# Patient Record
Sex: MALE | Race: WHITE | NOT HISPANIC OR LATINO | Employment: FULL TIME | URBAN - METROPOLITAN AREA
[De-identification: names, ages, dates, MRNs, and addresses within clinical notes are randomized per-mention and may not be internally consistent; named-entity substitution may affect disease eponyms.]

---

## 2017-04-10 NOTE — PROGRESS NOTES
"  SUBJECTIVE:                                                    Vijay Nicole is a 31 year old male who presents to clinic today for the following health issues:    New Patient/Transfer of Care    Family History of Hypertension with a personal  History of elevated bp o his home monitor:  The patient has a family hx of high blood pressure with his Dad. The patient has been having lower blood pressure in the morning but slightly raised in the evening. The patient checks his blood pressure regularly and gets readings of 115/70 to 140/90. The patient partakes in physical exercise twice a week. He denies headaches.    Family History of Breast Cancer:  The patient's Mom had breast cancer and his sister was positive for the BRCA2 gene. The patient is hoping to get genetic testing done as well to determine if he has breast cancer susceptible genes.    Hyperlipidemia:  When the patient was in high school, he was taking Accutane for treatment of acne. This caused his cholesterol levels to rise. The patient's paternal grandmother had hyperlipidemia.  No symptoms.  No checked for years       Problem list and histories reviewed & adjusted, as indicated.  Additional history: as documented      ROS:  Constitutional, HEENT, cardiovascular, pulmonary, GI, , musculoskeletal, neuro, skin, endocrine and psych systems are negative, except as otherwise noted.    This document serves as a record of the services and decisions personally performed and made by Arcadio Regan MD. It was created on his behalf by Evette Jon, a trained medical scribe. The creation of this document is based on the provider's statements to the medical scribe.  Evette Jon 8:02 AM 4/11/2017  OBJECTIVE:                                                    /78  Pulse 86  Temp 98.5  F (36.9  C) (Oral)  Ht 1.778 m (5' 10\")  Wt 93.4 kg (206 lb)  SpO2 99%  BMI 29.56 kg/m2 Body mass index is 29.56 kg/(m^2).   GENERAL: healthy, alert, well " nourished, well hydrated, no distress  HENT: ear canals- normal; TMs- normal; Nose- normal; Mouth- no ulcers, no lesions  NECK: no tenderness, no adenopathy, no asymmetry, no masses, no stiffness; thyroid- normal to palpation  RESP: lungs clear to auscultation - no rales, no rhonchi, no wheezes  CV: regular rates and rhythm, normal S1 S2, no S3 or S4 and no murmur, no click or rub -  ABDOMEN: soft, no tenderness, no  hepatosplenomegaly, no masses, normal bowel sounds  MS: extremities- no gross deformities noted, no edema  SKIN: no suspicious lesions, no rashes  BREAST: no masses, no tenderness, no nipple discharge, no palpable axillary masses or adenopathy    Diagnostic test results:  Future     ASSESSMENT/PLAN:       John was seen today for establish care.    Diagnoses and all orders for this visit:    Elevated blood pressure reading without diagnosis of hypertension - Continue self-monitoring blood pressure readings. Healthy diet and be more active.  -     Lipid panel reflex to direct LDL; Future  -     Basic metabolic panel; Future    CARDIOVASCULAR SCREENING; LDL GOAL LESS THAN 130    Family history of BRCA2 gene positive - he desires to be tested.  Genetic screening form signed today and will bring to is lab only appointment.   -     BRCA2 single mutation known; Future        Risks, benefits and alternatives of treatments discussed. Plan agreed on.      Followup: Fasting labs    Will call, return to clinic, or go to ED if worsening or symptoms not improving as discussed.    See patient instructions.     BP Screening:   Last 3 BP Readings:    BP Readings from Last 3 Encounters:   04/11/17 138/78       The following was recommended to the patient:  Re-screen BP within a year and recommended lifestyle modifications     Tobacco Cessation:   reports that he has never smoked. He does not have any smokeless tobacco history on file.    BMI:   Estimated body mass index is 29.56 kg/(m^2) as calculated from the  "following:    Height as of this encounter: 1.778 m (5' 10\").    Weight as of this encounter: 93.4 kg (206 lb).   Weight management plan: Patient referred to endocrine and/or weight management specialty      Health Maintenance Topics with due status: Overdue       Topic Date Due    TETANUS IMMUNIZATION (SYSTEM ASSIGNED) 05/25/2001       Health maintenance reviewed/updated? Yes    The information in this document, created by a scribe for me, accurately reflects the services I personally performed and the decisions made by me. I have reviewed and approved this document for accuracy.      Bowen Regan MD   "

## 2017-04-11 ENCOUNTER — OFFICE VISIT (OUTPATIENT)
Dept: FAMILY MEDICINE | Facility: CLINIC | Age: 32
End: 2017-04-11
Payer: COMMERCIAL

## 2017-04-11 VITALS
SYSTOLIC BLOOD PRESSURE: 138 MMHG | WEIGHT: 206 LBS | HEIGHT: 70 IN | OXYGEN SATURATION: 99 % | DIASTOLIC BLOOD PRESSURE: 78 MMHG | TEMPERATURE: 98.5 F | BODY MASS INDEX: 29.49 KG/M2 | HEART RATE: 86 BPM

## 2017-04-11 DIAGNOSIS — R03.0 ELEVATED BLOOD PRESSURE READING WITHOUT DIAGNOSIS OF HYPERTENSION: Primary | ICD-10-CM

## 2017-04-11 DIAGNOSIS — Z13.1 SCREENING FOR DIABETES MELLITUS: ICD-10-CM

## 2017-04-11 DIAGNOSIS — Z13.6 CARDIOVASCULAR SCREENING; LDL GOAL LESS THAN 130: ICD-10-CM

## 2017-04-11 DIAGNOSIS — Z84.81 FAMILY HISTORY OF BRCA2 GENE POSITIVE: ICD-10-CM

## 2017-04-11 PROCEDURE — 99202 OFFICE O/P NEW SF 15 MIN: CPT | Performed by: FAMILY MEDICINE

## 2017-04-11 NOTE — MR AVS SNAPSHOT
After Visit Summary   4/11/2017    John Nicole    MRN: [id number]           Patient Information     Date Of Birth          1985        Visit Information        Provider Department      4/11/2017 11:20 AM Arcadio Regan MD [facility] [place]        Today's Diagnoses     Elevated blood pressure reading without diagnosis of hypertension    -  1    CARDIOVASCULAR SCREENING; LDL GOAL LESS THAN 130        Family history of BRCA2 gene positive          Care Instructions                                     Dietary Approaches to Stop Hypertension (The DASH Diet)   What is hypertension?   Hypertension is blood pressure that keeps being higher than normal. Blood pressure is the force of blood against artery walls as the heart pumps blood through the body. Blood pressure can be unhealthy if it is above 120/80. The higher your blood pressure, the greater the health risk.   High blood pressure can be controlled if you take these steps:   Maintain a healthy weight.   Are physically active.   Follow a healthy eating plan, which includes foods that do not have a lot of salt and sodium.   Do not drink a lot of alcohol.   Diet affects high blood pressure. Following the DASH diet and reducing the amount of sodium in your diet will help lower your blood pressure. It will also help prevent high blood pressure.   What is the DASH diet?   Dietary Approaches to Stop Hypertension (DASH) is a diet that is low in saturated fat, cholesterol, and total fat. It emphasizes fruits, vegetables, and low-fat dairy foods. The DASH diet also includes whole-grain products, fish, poultry, and nuts. It encourages fewer servings of red meat, sweets, and sugar-containing beverages. It is rich in magnesium, potassium, and calcium, as well as protein and fiber.   How do I get started on the DASH diet?   The DASH diet requires no special foods and has no hard-to-follow recipes. Start by seeing how DASH compares with your current  eating habits.   The DASH eating plan illustrated below is based on a diet of 2,000 calories a day. Your healthcare provider or a dietitian can help you determine how many calories a day you need. Most adults need somewhere between 1600 and 2800 calories a day. Serving sizes for different foods vary from 1/2 cup to 1 and 1/4 cups. Check product nutrition labels for serving sizes and the number of calories per serving.                      Number of        Examples of  Food Group      servings         serving size  ----------------------------------------------------------------    Grains and      7 to 8 per day   1 slice of bread  grain products                   1 cup ready-to-eat cold cereal                                   1/2 cup cooked rice, pasta,                                   or cereal    Vegetables      4 to 5 per day   1 cup raw leafy vegetable                                   1/2 cup cooked vegetable                                   6 ounces (oz) vegetable juice      Fruits          4 to 5 per day   1 medium fruit                                   1/4 cup dried fruit                                   1/2 cup fresh, frozen, or                                   canned fruit                                   6 oz fruit juice      Low-fat or      2 to 3 per day   8 oz milk  fat-free                         1 cup yogurt  dairy foods                      1 and 1/2 oz cheese    Lean meats,  poultry,        2 or fewer per   3 oz cooked lean meat,  or fish         day              skinless poultry, or fish    Nuts, seeds,    4 to 5 per week  1/3 cup or 1 and 1/2 oz nuts  and dry beans                    1 tablespoon or 1/2 oz seeds                                   1/2 cup cooked dry beans    Fats and oils   2 to 3 per day   1 teaspoon soft margarine                                   1 tablespoon low-fat mayonnaise                                   2 tablespoons light salad                                    dressing                                   1 teaspoon vegetable oil    Sweets          5 per week       1 tablespoon sugar                                   1 tablespoon jelly or jam                                   1/2 oz jelly beans                                   8 oz lemonade  ----------------------------------------------------------------  Make changes gradually. Here are some suggestions that might help:   If you now eat 1 or 2 servings of vegetables a day, add a serving at lunch and another at dinner.   If you have not been eating fruit regularly, or have only juice at breakfast, add a serving to your meals or have it as a snack.   Drink milk or water with lunch or dinner instead of soda, sugar-sweetened tea, or alcohol. Choose low-fat (1%) or fat-free (nonfat) dairy products so that you are eating fewer calories and less saturated fat, total fat, and cholesterol.   Read food labels on margarines and salad dressings to choose products lowest in fat.   If you now eat large portions of meat, slowly cut back--by a half or a third at each meal. Limit meat to 6 ounces a day (two 3-ounce servings). Three to 4 ounces is about the size of a deck of cards.   Have 2 or more meatless meals each week. Increase servings of vegetables, rice, pasta, and beans in all meals. Try casseroles, pasta, and stir-fuentes dishes, which have less meat and more vegetables, grains, and beans.   Use fruits canned in their own juice. Fresh fruits require little or no preparation. Dried fruits are a good choice to carry with you or to have ready in the car.   Try these snacks ideas: unsalted pretzels or nuts mixed with raisins, matthew crackers, low-fat and fat-free yogurt or frozen yogurt, popcorn with no salt or butter added, and raw vegetables.   Choose whole-grain foods to get more nutrients, including minerals and fiber. For example, choose whole-wheat bread, whole-grain cereals, or brown rice.   Use fresh, frozen, or no-salt-added  canned vegetables.   Remember to also reduce the salt and sodium in your diet. Try to have no more than 2000 milligrams (mg) of sodium per day, with a goal of further reducing the sodium to 1500 mg per day. Three important ways to reduce sodium are:   Eat food products with reduced-sodium or no salt added.   Use less salt when you prepare foods and do not add salt to your food at the table.   Read food labels. Aim for foods that contain less than 5% of the daily value of sodium.   The DASH eating plan is not designed for weight loss. But it contains many lower-calorie foods, such as fruits and vegetables. You can make it lower in calories by replacing high-calorie foods with more fruits and vegetables. Some ideas to increase fruits and vegetables and decrease calories include:   Eat a medium apple instead of 4 shortbread cookies. You'll save 80 calories.   Eat 1/4 cup of dried apricots instead of a 2-ounce bag of pork rinds. You'll save 230 calories.   Have a hamburger that's 3 ounces instead of 6 ounces. Add a 1/2 cup serving of carrots and a 1/2 cup serving of spinach. You'll save more than 200 calories.   Instead of 5 ounces of chicken, have a stir fuentes with 2 ounces of chicken and 1 and 1/2 cups of raw vegetables. Use just a small amount of vegetable oil. You'll save 50 calories.   Have a 1/2 cup serving of low-fat frozen yogurt instead of a 1-and-1/2-ounce chocolate bar. You'll save about 110 calories.   Use low-fat or fat-free condiments, such as fat-free salad dressings.   Eat smaller portions. Cut back gradually.   Use food labels to compare fat and calorie content in packaged foods. Items marked low fat or fat free may be lower in fat but not lower in calories than their regular versions.   Limit foods with lots of added sugar, such as pies, flavored yogurts, candy bars, ice cream, sherbet, regular soft drinks, and fruit drinks.   Drink water or club soda instead of cola or other soda drinks.     For more  "information, see the National Heart, Lung, and Blood [place] Web site at: http://www.nhlbi.nih.gov/health/public/heart/hbp/dash/.                  Thank you very much for trusting me and [facility].   I appreciate the opportunity to serve you and look forward to supporting your healthcare needs in the future.    Sincerely,         Bowen Regan MD           Follow-ups after your visit        Future tests that were ordered for you today     Open Future Orders        Priority Expected Expires Ordered    Lipid panel reflex to direct LDL Routine 5/11/2017 6/10/2017 4/11/2017    Basic metabolic panel Routine 5/11/2017 6/10/2017 4/11/2017    BRCA2 single mutation known Routine 5/11/2017 4/11/2018 4/11/2017            Who to contact     If you have questions or need follow up information about today's clinic visit or your schedule please contact [facility] directly at [phone].  Normal or non-critical lab and imaging results will be communicated to you by BlastRootshart, letter or phone within 4 business days after the clinic has received the results. If you do not hear from us within 7 days, please contact the clinic through BlastRootshart or phone. If you have a critical or abnormal lab result, we will notify you by phone as soon as possible.  Submit refill requests through Telespree or call your pharmacy and they will forward the refill request to us. Please allow 3 business days for your refill to be completed.          Additional Information About Your Visit        BlastRootshart Information     Telespree lets you send messages to your doctor, view your test results, renew your prescriptions, schedule appointments and more. To sign up, go to [email or web address] . Click on \"Log in\" on the left side of the screen, which will take you to the Welcome page. Then click on \"Sign up Now\" on the right side of the page.     You will be asked to enter the access code listed below, as well as some personal " "information. Please follow the directions to create your username and password.     Your access code is: [email or web address]  Expires: 7/10/2017 12:04 PM     Your access code will  in 90 days. If you need help or a new code, please call your [facility] clinic or [phone].        Care EveryWhere ID     This is your Care EveryWhere ID. This could be used by other organizations to access your [facility] medical records  [id number]        Your Vitals Were     Pulse Temperature Height Pulse Oximetry BMI (Body Mass Index)       86 98.5  F (36.9  C) (Oral) 5' 10\" (1.778 m) 99% 29.56 kg/m2        Blood Pressure from Last 3 Encounters:   17 138/78    Weight from Last 3 Encounters:   17 206 lb (93.4 kg)               Primary Care Provider Office Phone # Fax #    Arcadio Regan [phone]       [address] [address]        Thank you!     Thank you for choosing [facility]  for your care. Our goal is always to provide you with excellent care. Hearing back from our patients is one way we can continue to improve our services. Please take a few minutes to complete the written survey that you may receive in the mail after your visit with us. Thank you!             Your Updated Medication List - Protect others around you: Learn how to safely use, store and throw away your medicines at www.disposemymeds.org.      Notice  As of 2017 12:04 PM    You have not been prescribed any medications.      "

## 2017-04-11 NOTE — PATIENT INSTRUCTIONS
Dietary Approaches to Stop Hypertension (The DASH Diet)   What is hypertension?   Hypertension is blood pressure that keeps being higher than normal. Blood pressure is the force of blood against artery walls as the heart pumps blood through the body. Blood pressure can be unhealthy if it is above 120/80. The higher your blood pressure, the greater the health risk.   High blood pressure can be controlled if you take these steps:   Maintain a healthy weight.   Are physically active.   Follow a healthy eating plan, which includes foods that do not have a lot of salt and sodium.   Do not drink a lot of alcohol.   Diet affects high blood pressure. Following the DASH diet and reducing the amount of sodium in your diet will help lower your blood pressure. It will also help prevent high blood pressure.   What is the DASH diet?   Dietary Approaches to Stop Hypertension (DASH) is a diet that is low in saturated fat, cholesterol, and total fat. It emphasizes fruits, vegetables, and low-fat dairy foods. The DASH diet also includes whole-grain products, fish, poultry, and nuts. It encourages fewer servings of red meat, sweets, and sugar-containing beverages. It is rich in magnesium, potassium, and calcium, as well as protein and fiber.   How do I get started on the DASH diet?   The DASH diet requires no special foods and has no hard-to-follow recipes. Start by seeing how DASH compares with your current eating habits.   The DASH eating plan illustrated below is based on a diet of 2,000 calories a day. Your healthcare provider or a dietitian can help you determine how many calories a day you need. Most adults need somewhere between 1600 and 2800 calories a day. Serving sizes for different foods vary from 1/2 cup to 1 and 1/4 cups. Check product nutrition labels for serving sizes and the number of calories per serving.                      Number of        Examples of  Food Group      servings          serving size  ----------------------------------------------------------------    Grains and      7 to 8 per day   1 slice of bread  grain products                   1 cup ready-to-eat cold cereal                                   1/2 cup cooked rice, pasta,                                   or cereal    Vegetables      4 to 5 per day   1 cup raw leafy vegetable                                   1/2 cup cooked vegetable                                   6 ounces (oz) vegetable juice      Fruits          4 to 5 per day   1 medium fruit                                   1/4 cup dried fruit                                   1/2 cup fresh, frozen, or                                   canned fruit                                   6 oz fruit juice      Low-fat or      2 to 3 per day   8 oz milk  fat-free                         1 cup yogurt  dairy foods                      1 and 1/2 oz cheese    Lean meats,  poultry,        2 or fewer per   3 oz cooked lean meat,  or fish         day              skinless poultry, or fish    Nuts, seeds,    4 to 5 per week  1/3 cup or 1 and 1/2 oz nuts  and dry beans                    1 tablespoon or 1/2 oz seeds                                   1/2 cup cooked dry beans    Fats and oils   2 to 3 per day   1 teaspoon soft margarine                                   1 tablespoon low-fat mayonnaise                                   2 tablespoons light salad                                   dressing                                   1 teaspoon vegetable oil    Sweets          5 per week       1 tablespoon sugar                                   1 tablespoon jelly or jam                                   1/2 oz jelly beans                                   8 oz lemonade  ----------------------------------------------------------------  Make changes gradually. Here are some suggestions that might help:   If you now eat 1 or 2 servings of vegetables a day, add a serving at lunch and  another at dinner.   If you have not been eating fruit regularly, or have only juice at breakfast, add a serving to your meals or have it as a snack.   Drink milk or water with lunch or dinner instead of soda, sugar-sweetened tea, or alcohol. Choose low-fat (1%) or fat-free (nonfat) dairy products so that you are eating fewer calories and less saturated fat, total fat, and cholesterol.   Read food labels on margarines and salad dressings to choose products lowest in fat.   If you now eat large portions of meat, slowly cut back--by a half or a third at each meal. Limit meat to 6 ounces a day (two 3-ounce servings). Three to 4 ounces is about the size of a deck of cards.   Have 2 or more meatless meals each week. Increase servings of vegetables, rice, pasta, and beans in all meals. Try casseroles, pasta, and stir-fuentes dishes, which have less meat and more vegetables, grains, and beans.   Use fruits canned in their own juice. Fresh fruits require little or no preparation. Dried fruits are a good choice to carry with you or to have ready in the car.   Try these snacks ideas: unsalted pretzels or nuts mixed with raisins, matthew crackers, low-fat and fat-free yogurt or frozen yogurt, popcorn with no salt or butter added, and raw vegetables.   Choose whole-grain foods to get more nutrients, including minerals and fiber. For example, choose whole-wheat bread, whole-grain cereals, or brown rice.   Use fresh, frozen, or no-salt-added canned vegetables.   Remember to also reduce the salt and sodium in your diet. Try to have no more than 2000 milligrams (mg) of sodium per day, with a goal of further reducing the sodium to 1500 mg per day. Three important ways to reduce sodium are:   Eat food products with reduced-sodium or no salt added.   Use less salt when you prepare foods and do not add salt to your food at the table.   Read food labels. Aim for foods that contain less than 5% of the daily value of sodium.   The DASH eating  plan is not designed for weight loss. But it contains many lower-calorie foods, such as fruits and vegetables. You can make it lower in calories by replacing high-calorie foods with more fruits and vegetables. Some ideas to increase fruits and vegetables and decrease calories include:   Eat a medium apple instead of 4 shortbread cookies. You'll save 80 calories.   Eat 1/4 cup of dried apricots instead of a 2-ounce bag of pork rinds. You'll save 230 calories.   Have a hamburger that's 3 ounces instead of 6 ounces. Add a 1/2 cup serving of carrots and a 1/2 cup serving of spinach. You'll save more than 200 calories.   Instead of 5 ounces of chicken, have a stir fuentes with 2 ounces of chicken and 1 and 1/2 cups of raw vegetables. Use just a small amount of vegetable oil. You'll save 50 calories.   Have a 1/2 cup serving of low-fat frozen yogurt instead of a 1-and-1/2-ounce chocolate bar. You'll save about 110 calories.   Use low-fat or fat-free condiments, such as fat-free salad dressings.   Eat smaller portions. Cut back gradually.   Use food labels to compare fat and calorie content in packaged foods. Items marked low fat or fat free may be lower in fat but not lower in calories than their regular versions.   Limit foods with lots of added sugar, such as pies, flavored yogurts, candy bars, ice cream, sherbet, regular soft drinks, and fruit drinks.   Drink water or club soda instead of cola or other soda drinks.     For more information, see the National Heart, Lung, and Blood [place] Web site at: http://www.nhlbi.nih.gov/health/public/heart/hbp/dash/.                  Thank you very much for trusting me and [facility].   I appreciate the opportunity to serve you and look forward to supporting your healthcare needs in the future.    Sincerely,         Bowen Regan MD

## 2017-04-11 NOTE — NURSING NOTE
"Chief Complaint   Patient presents with     Establish Care       Initial /80 (BP Location: Left arm, Patient Position: Chair, Cuff Size: Adult Large)  Pulse 86  Temp 98.5  F (36.9  C) (Oral)  Ht 5' 10\" (1.778 m)  Wt 206 lb (93.4 kg)  SpO2 99%  BMI 29.56 kg/m2 Estimated body mass index is 29.56 kg/(m^2) as calculated from the following:    Height as of this encounter: 5' 10\" (1.778 m).    Weight as of this encounter: 206 lb (93.4 kg).  Medication Reconciliation: complete  "

## 2017-04-25 ENCOUNTER — TRANSFERRED RECORDS (OUTPATIENT)
Dept: HEALTH INFORMATION MANAGEMENT | Facility: CLINIC | Age: 32
End: 2017-04-25

## 2017-04-25 LAB
ALT SERPL-CCNC: 27 U/L (ref 0–45)
AST SERPL-CCNC: 21 U/L (ref 0–41)
CHOLEST SERPL-MCNC: 281 MG/DL (ref 140–280)
CREAT SERPL-MCNC: 0.98 MG/DL (ref 0.6–1.5)
GFR SERPL CREATININE-BSD FRML MDRD: 126.41 ML/MIN (ref 65–186)
GLUCOSE SERPL-MCNC: 80 MG/DL (ref 70–110)
HDLC SERPL-MCNC: 58.2 MG/DL (ref 25–75)
LDLC SERPL CALC-MCNC: 200 MG/DL (ref 80–200)
TRIGL SERPL-MCNC: 113 MG/DL (ref 10–200)

## 2017-05-04 ENCOUNTER — MYC MEDICAL ADVICE (OUTPATIENT)
Dept: FAMILY MEDICINE | Facility: CLINIC | Age: 32
End: 2017-05-04

## 2017-05-10 DIAGNOSIS — R03.0 ELEVATED BLOOD PRESSURE READING WITHOUT DIAGNOSIS OF HYPERTENSION: ICD-10-CM

## 2017-05-10 DIAGNOSIS — Z84.81 FAMILY HISTORY OF GENETIC DISEASE CARRIER: Primary | ICD-10-CM

## 2017-05-10 DIAGNOSIS — Z84.81 FAMILY HISTORY OF BRCA2 GENE POSITIVE: ICD-10-CM

## 2017-05-10 DIAGNOSIS — Z13.1 SCREENING FOR DIABETES MELLITUS: ICD-10-CM

## 2017-06-14 LAB — Lab: NORMAL

## 2017-10-24 ENCOUNTER — HOSPITAL ENCOUNTER (OUTPATIENT)
Dept: LAB | Facility: CLINIC | Age: 32
Discharge: HOME | End: 2017-10-24
Attending: GENETIC COUNSELOR, MS | Admitting: FAMILY MEDICINE
Payer: COMMERCIAL

## 2017-10-24 ENCOUNTER — ONCOLOGY VISIT (OUTPATIENT)
Dept: ONCOLOGY | Facility: CLINIC | Age: 32
End: 2017-10-24
Attending: GENETIC COUNSELOR, MS
Payer: COMMERCIAL

## 2017-10-24 DIAGNOSIS — Z84.81 FAMILY HISTORY OF GENETIC DISEASE CARRIER: ICD-10-CM

## 2017-10-24 DIAGNOSIS — Z80.3 FAMILY HISTORY OF MALIGNANT NEOPLASM OF BREAST: Primary | ICD-10-CM

## 2017-10-24 PROCEDURE — 96040 ZZH GENETIC COUNSELING, EACH 30 MINUTES: CPT | Performed by: GENETIC COUNSELOR, MS

## 2017-10-24 PROCEDURE — 40000812: Performed by: FAMILY MEDICINE

## 2017-10-24 NOTE — PATIENT INSTRUCTIONS
Assessing Cancer Risk  Only about 5-10% of cancers are thought to be due to an inherited cancer susceptibility gene.    These families often have:    Several people with the same or related types of cancer    Cancers diagnosed at a young age (before age 50)    Individuals with more than one primary cancer    Multiple generations of the family affected with cancer    BRCA1 and BRCA2 Genes  We each inherit two copies of every gene in our bodies: one from our mother, and one from our father.  Each gene has a specific job to do.  When a gene has a mistake or  mutation  in it, it does not work like it should.  Everyone has two copies of BRCA1 and two copies of BRCA2.  A single mutation in one of the copies of BRCA1 or BRCA2 increases the risk for breast and ovarian cancer, among others.  The risk for pancreatic cancer and melanoma may also be slightly increased in some families.  The tables below list the chance that someone with a BRCA mutation would develop cancer in his or her lifetime1,2,3,4.      Women   Men    General Population BRCA +   General Population BRCA +   Breast 12% 40-85%  Breast <1% ~7%   Ovarian 1-2% 10-40%  Prostate 16% 20%     Inheriting a mutation does not mean a person will develop cancer, but it does significantly increase his or her risk above the general population risk.    A person s ethnic background is also important to consider, as individuals of Ashkenazi Anabaptism ancestry have a higher chance of having a BRCA gene mutation.  There are three BRCA mutations that occur more frequently in this population.     Genetic Testing  Genetic testing involves a simple blood test and will look at the genetic information in the BRCA1 and BRCA2 genes for any harmful mutations that are associated with increased cancer risk.  If possible, it is recommended that the person(s) who has had cancer be tested before other family members.  That person will give us the most useful information about whether or not  a specific gene is associated with the cancer in the family.     Results  There are three possible results of BRCA1 and BRCA2 genetic testing:    Positive--a harmful mutation was identified    Negative--no mutation was identified    Variant of unknown significance--a variation in one of the genes was identified, but it is unclear how this impacts cancer risk in the family  Advantages and Disadvantages  There are advantages and disadvantages to genetic testing of these genes.    Advantages    May clarify your cancer risk    Can help you make medical decisions    May explain the cancers in your family    May give useful information to your family members (if you share your results)    Disadvantages    Possible negative emotional impact of learning about inherited cancer risk    Uncertainty in interpreting a negative test result in some situations    Possible genetic discrimination concerns (see below)    Inheritance   BRCA1 and BRCA2 mutations are inherited in an autosomal dominant pattern.  This means that if a parent has a mutation, each of his or her children will have a 50% chance of inheriting that same mutation.  Therefore, each child--male or female--would have a 50% chance of being at increased risk for developing cancer.                                              Image obtained from Genetics Home Reference, 2013     Genetic Information Nondiscrimination Act (JASPER)  JASPER is a federal law that protects individuals from health insurance or employment discrimination based on a genetic test result alone.  Although rare, there are currently no legal protections in terms of life insurance, long term care, or disability insurances.  Visit the National Human Genome Research [place] at Genome.gov/43856933 to learn more.    Reducing Cancer Risk  Current screening recommendations for women with a BRCA mutation include1:    Breast:  o Breast awareness starting at age 18  o Clinical breast exams starting at age 25;  repeated every 6-12 months  o Annual breast MRI starting at age 25 (or possibly younger)  o Annual mammogram and breast MRI at age 30 (for women with and without a breast cancer history)  o Consideration of preventative mastectomy    Ovarian:   o Consideration of transvaginal ultrasound and CA-125 blood test starting at age 30 (or possibly younger); repeated every 6 months  o Recommendation for surgery to remove the ovaries and fallopian tubes after child bearing or by age 35-40     Current screening recommendations for men with a BRCA mutation include1:    Breast:  o Self-breast exams starting at age 35  o Annual clinical breast exams starting at age 35    Prostate:   o Recommend prostate cancer screening starting at age 45 for BRCA2 carriers  o Consider prostate cancer screening starting at age 45 for BRCA1 carriers    Both men and women with BRCA mutations should talk to their doctor or genetic counselor about screening for pancreatic cancer and melanoma.  In addition, the age at which to start screening may be modified based on family history of young cancer.    Questions to Think About Regarding Genetic Testing    What effect will the test result have on me and my relationship with my family members if I have an inherited gene mutation?  If I don t have a gene mutation?    Should I share my test results, and how will my family react to this news, which may also affect them?    Are my children ready to learn new information that may one day affect their own health?    Resources  FORCE: Facing Our Risk of Cancer Empowered facingourrisk.org   Bright Pink bebrightpink.org   American Cancer Society (ACS) cancer.org   National Cancer [place] (NCI) cancer.gov     Please call us if you have any questions or concerns.     Cancer Risk Management Program [phone]  ? Ailin Pelayo, MS, [facility]  [phone]  ? Arelis Vasquez, MS [facility]          [phone]  ? Kelly Strauss, MS, [facility]  [phone]  ? Marsha  David MS, [facility]  [phone]  ? Margie Bradley, MS, [facility]  [phone]    References:  1. National Comprehensive Cancer Network. Clinical practice guidelines in oncology, colorectal cancer screening. Available online (registration required). 2013.      Patient to return to clinic in 4-5 weeks. Mari Helms

## 2017-10-24 NOTE — MR AVS SNAPSHOT
After Visit Summary   10/24/2017    John Nicole    MRN: [id number]           Patient Information     Date Of Birth          1985        Visit Information        Provider Department      10/24/2017 11:15 AM Ailin Pelayo GC Cancer Risk Management Program        Today's Diagnoses     Family history of malignant neoplasm of breast    -  1      Care Instructions        Assessing Cancer Risk  Only about 5-10% of cancers are thought to be due to an inherited cancer susceptibility gene.    These families often have:    Several people with the same or related types of cancer    Cancers diagnosed at a young age (before age 50)    Individuals with more than one primary cancer    Multiple generations of the family affected with cancer    BRCA1 and BRCA2 Genes  We each inherit two copies of every gene in our bodies: one from our mother, and one from our father.  Each gene has a specific job to do.  When a gene has a mistake or  mutation  in it, it does not work like it should.  Everyone has two copies of BRCA1 and two copies of BRCA2.  A single mutation in one of the copies of BRCA1 or BRCA2 increases the risk for breast and ovarian cancer, among others.  The risk for pancreatic cancer and melanoma may also be slightly increased in some families.  The tables below list the chance that someone with a BRCA mutation would develop cancer in his or her lifetime1,2,3,4.      Women   Men    General Population BRCA +   General Population BRCA +   Breast 12% 40-85%  Breast <1% ~7%   Ovarian 1-2% 10-40%  Prostate 16% 20%     Inheriting a mutation does not mean a person will develop cancer, but it does significantly increase his or her risk above the general population risk.    A person s ethnic background is also important to consider, as individuals of Ashkenazi Religious ancestry have a higher chance of having a BRCA gene mutation.  There are three BRCA mutations that occur more frequently in this population.      Genetic Testing  Genetic testing involves a simple blood test and will look at the genetic information in the BRCA1 and BRCA2 genes for any harmful mutations that are associated with increased cancer risk.  If possible, it is recommended that the person(s) who has had cancer be tested before other family members.  That person will give us the most useful information about whether or not a specific gene is associated with the cancer in the family.     Results  There are three possible results of BRCA1 and BRCA2 genetic testing:    Positive--a harmful mutation was identified    Negative--no mutation was identified    Variant of unknown significance--a variation in one of the genes was identified, but it is unclear how this impacts cancer risk in the family  Advantages and Disadvantages  There are advantages and disadvantages to genetic testing of these genes.    Advantages    May clarify your cancer risk    Can help you make medical decisions    May explain the cancers in your family    May give useful information to your family members (if you share your results)    Disadvantages    Possible negative emotional impact of learning about inherited cancer risk    Uncertainty in interpreting a negative test result in some situations    Possible genetic discrimination concerns (see below)    Inheritance   BRCA1 and BRCA2 mutations are inherited in an autosomal dominant pattern.  This means that if a parent has a mutation, each of his or her children will have a 50% chance of inheriting that same mutation.  Therefore, each child--male or female--would have a 50% chance of being at increased risk for developing cancer.                                              Image obtained from Genetics Home Reference, 2013     Genetic Information Nondiscrimination Act (JASPER)  JASPER is a federal law that protects individuals from health insurance or employment discrimination based on a genetic test result alone.  Although rare, there  are currently no legal protections in terms of life insurance, long term care, or disability insurances.  Visit the National Human Genome Research [place] at Genome.gov/92648374 to learn more.    Reducing Cancer Risk  Current screening recommendations for women with a BRCA mutation include1:    Breast:  o Breast awareness starting at age 18  o Clinical breast exams starting at age 25; repeated every 6-12 months  o Annual breast MRI starting at age 25 (or possibly younger)  o Annual mammogram and breast MRI at age 30 (for women with and without a breast cancer history)  o Consideration of preventative mastectomy    Ovarian:   o Consideration of transvaginal ultrasound and CA-125 blood test starting at age 30 (or possibly younger); repeated every 6 months  o Recommendation for surgery to remove the ovaries and fallopian tubes after child bearing or by age 35-40     Current screening recommendations for men with a BRCA mutation include1:    Breast:  o Self-breast exams starting at age 35  o Annual clinical breast exams starting at age 35    Prostate:   o Recommend prostate cancer screening starting at age 45 for BRCA2 carriers  o Consider prostate cancer screening starting at age 45 for BRCA1 carriers    Both men and women with BRCA mutations should talk to their doctor or genetic counselor about screening for pancreatic cancer and melanoma.  In addition, the age at which to start screening may be modified based on family history of young cancer.    Questions to Think About Regarding Genetic Testing    What effect will the test result have on me and my relationship with my family members if I have an inherited gene mutation?  If I don t have a gene mutation?    Should I share my test results, and how will my family react to this news, which may also affect them?    Are my children ready to learn new information that may one day affect their own health?    Resources  FORCE: Facing Our Risk of Cancer Empowered  facingourrisk.org   [place] bebrightpink.org   American Cancer Society (ACS) cancer.org   National Cancer [place] (NCI) cancer.gov     Please call us if you have any questions or concerns.     Cancer Risk Management Program [phone]-CANCER ([phone]  ? Ailin Pelayo, MS, [facility]  [phone]  ? Arelis Vasquez, MS [facility]          [phone]  ? Kelly Strauss, MS, [facility]  [phone]  ? Marsha Yayoyo, MS, [facility]  [phone]  ? Margie Kirk, MS, [facility]  [phone]    References:  1. National Comprehensive Cancer Network. Clinical practice guidelines in oncology, colorectal cancer screening. Available online (registration required). 2013.      Patient to return to clinic in 4-5 weeks.          Follow-ups after your visit        Follow-up notes from your care team     Return in about 5 weeks (around 11/28/2017).      Your next 10 appointments already scheduled     Nov 29, 2017  8:00 AM CST   Return Visit with Ailin Pelayo GC   Cancer Risk Management Program ([facility])    [facility]  [address]  [address]  [facility] [id number] [phone]              Future tests that were ordered for you today     Open Future Orders        Priority Expected Expires Ordered    BRCA2 GENE ANALYSIS KNOWN FAMILIAL VARIANT Routine 10/24/2017 12/24/2017 10/24/2017            Who to contact     If you have questions or need follow up information about today's clinic visit or your schedule please contact CANCER RISK MANAGEMENT PROGRAM directly at [phone].  Normal or non-critical lab and imaging results will be communicated to you by MyChart, letter or phone within 4 business days after the clinic has received the results. If you do not hear from us within 7 days, please contact the clinic through MyChart or phone. If you have a critical or abnormal lab result, we will notify you by phone as soon as possible.  Submit refill requests through [employer] or call your pharmacy and they will forward the  refill request to us. Please allow 3 business days for your refill to be completed.          Additional Information About Your Visit        [employer] Information     [employer] gives you secure access to your electronic health record. If you see a primary care provider, you can also send messages to your care team and make appointments. If you have questions, please call your primary care clinic.  If you do not have a primary care provider, please call [phone] and they will assist you.        Care EveryWhere ID     This is your Care EveryWhere ID. This could be used by other organizations to access your [place] medical records  [id number]         Blood Pressure from Last 3 Encounters:   04/11/17 138/78    Weight from Last 3 Encounters:   04/11/17 93.4 kg (206 lb)               Primary Care Provider Office Phone # Fax #    Arcadio Regan [phone] [address] [facility] [address]        Equal Access to Services     [facility] CenterCRISELDA : Hadii aad ku hadasho Soomaali, waaxda luqadaha, qaybta kaalmada adeegyada, waxay idiin haytimn jennie gomezn . So [facility] [phone].    ATENCIÓN: Si habla español, tiene a ibarra disposición servicios gratuitos de asistencia lingüística. Llame al [phone].    We comply with applicable federal civil rights laws and [place] laws. We do not discriminate on the basis of race, color, national origin, age, disability, sex, sexual orientation, or gender identity.            Thank you!     Thank you for choosing CANCER RISK MANAGEMENT PROGRAM  for your care. Our goal is always to provide you with excellent care. Hearing back from our patients is one way we can continue to improve our services. Please take a few minutes to complete the written survey that you may receive in the mail after your visit with us. Thank you!             Your Updated Medication List - Protect others around you: Learn how to safely use, store and throw away your medicines at  www.disposemymeds.org.      Notice  As of 10/24/2017 11:59 AM    You have not been prescribed any medications.

## 2017-10-24 NOTE — PROGRESS NOTES
10/24/2017    Referring Provider: Arcadio Regan MD    Presenting Information:   I met with John Nicole today for genetic counseling at the Cancer Risk Management Program at the [facility]  to discuss about the known BRCA2 mutation in his family. Specifically, the familial BRCA2 mutation is K994X (3058A>T). He is here today to review this history, cancer screening recommendations, and available genetic testing options.    Personal History:  John is a 32 year old male.  He is healthy and does not have any personal history of cancer.       Family History: (Please see scanned pedigree for detailed family history information)    His mother was diagnosed with breast cancer at age 48.  She has a mutation in the BRCA2 gene. She is now 58.  She had a hysterectomy and her ovaries removed because of the mutation identified in the BRCA2 gene.    His sister is healthy at age 30 and she also has the same BRCA2 mutation.    There is no other family history of cancer on either the maternal or paternal side of the family.    His maternal ethnicity is [place] and Polish. His paternal ethnicity is Slovakian.  There is no known Ashkenazi Advent ancestry on either side of his family.     Discussion:    We discussed the natural history and genetics of the BRCA2 gene. A detailed handout regarding the BRCA2 gene and the information we discussed was provided to John at the end of our appointment today and can be found in the after visit summary.  Topics included: inheritance pattern, cancer risks, cancer screening recommendations, and also risks, benefits and limitations of testing.    Based on John's mother's genetic test result, John has a 50% chance of also carrying the same genetic change in the BRCA2 gene.    Based on this, John meets the National Comprehensive Cancer Network (NCCN) criteria for genetic testing of the familial BRCA2 mutation.      Genetic testing is available for: Specific Analysis of the BRCA2 gene  testing for the known familial mutation, K944X.     We discussed the implications of both positive and negative test result for John.    If testing comes back positive, John will be recommended to follow the screening recommendations for men with BRCA2 mutations as published by the National Comprehensive Cancer Network (NCCN). Men with a BRCA2 mutation need to have clinical breast exams starting at age 35.  They also need to start prostate cancer screening at age 45.     If testing comes back negative, John would not be at an increased risk for BRCA2-associated cancers.     Medical Management: For John, we reviewed that the information from genetic testing may determine:    additional cancer screening for which John may qualify (i.e. clinical breast exams at age 35 and prostate cancer screening beginning at age 45.) These recommendations will be discussed in detail once genetic testing is completed.    Blood was drawn today and sent to [facility] for testing.  A consent form was also signed.      Plan:  1) Today John elected to proceed with genetic testing for the Specific Analysis of the BRCA2 gene mutation in his family.  2) This information should be available in 4-5 weeks.  3) John will return to clinic to discuss the results    Face to face time: 25 minutes    Ailin Pelayo MS [facility]  Genetic Counselor  [phone]

## 2017-10-24 NOTE — LETTER
10/24/2017         RE: John Nicole  [address] [place] [address]        Dear Colleague,    Thank you for referring your patient, John Nicole, to the CANCER RISK MANAGEMENT PROGRAM. Please see a copy of my visit note below.    10/24/2017    Referring Provider: Arcadio Regan MD    Presenting Information:   I met with John Nicole today for genetic counseling at the Cancer Risk Management Program at the [facility]  to discuss about the known BRCA2 mutation in his family. Specifically, the familial BRCA2 mutation is K994X ([id number]). He is here today to review this history, cancer screening recommendations, and available genetic testing options.    Personal History:  John is a 32 year old male.  He is healthy and does not have any personal history of cancer.       Family History: (Please see scanned pedigree for detailed family history information)    His mother was diagnosed with breast cancer at age 48.  She has a mutation in the BRCA2 gene. She is now 58.  She had a hysterectomy and her ovaries removed because of the mutation identified in the BRCA2 gene.    His sister is healthy at age 30 and she also has the same BRCA2 mutation.    There is no other family history of cancer on either the maternal or paternal side of the family.    His maternal ethnicity is Citizen of [place] and Polish. His paternal ethnicity is Slovakian.  There is no known Ashkenazi Protestant ancestry on either side of his family.     Discussion:    We discussed the natural history and genetics of the BRCA2 gene. A detailed handout regarding the BRCA2 gene and the information we discussed was provided to John at the end of our appointment today and can be found in the after visit summary.  Topics included: inheritance pattern, cancer risks, cancer screening recommendations, and also risks, benefits and limitations of testing.    Based on John's mother's genetic test result, John has a 50% chance of also carrying the same genetic  change in the BRCA2 gene.    Based on this, John meets the National Comprehensive Cancer Network (NCCN) criteria for genetic testing of the familial BRCA2 mutation.      Genetic testing is available for: Specific Analysis of the BRCA2 gene testing for the known familial mutation, K944X.     We discussed the implications of both positive and negative test result for John.    If testing comes back positive, John will be recommended to follow the screening recommendations for men with BRCA2 mutations as published by the National Comprehensive Cancer Network (NCCN). Men with a BRCA2 mutation need to have clinical breast exams starting at age 35.  They also need to start prostate cancer screening at age 45.     If testing comes back negative, John would not be at an increased risk for BRCA2-associated cancers.     Medical Management: For John, we reviewed that the information from genetic testing may determine:    additional cancer screening for which John may qualify (i.e. clinical breast exams at age 35 and prostate cancer screening beginning at age 45.) These recommendations will be discussed in detail once genetic testing is completed.    Blood was drawn today and sent to [facility] for testing.  A consent form was also signed.      Plan:  1) Today John elected to proceed with genetic testing for the Specific Analysis of the BRCA2 gene mutation in his family.  2) This information should be available in 4-5 weeks.  3) John will return to clinic to discuss the results    Face to face time: 25 minutes    Ailin Pelayo MS [facility]  Genetic Counselor  [phone]      Again, thank you for allowing me to participate in the care of your patient.        Sincerely,        Ailin Pelayo GC

## 2017-10-24 NOTE — LETTER
Cancer Risk Management  Program Locations    [facility]  [facility]  [facility]  [facility]  Mailing Address  Cancer Risk Management Program  [facility]  [address]  [place] [address]    New patient appointments  [phone]  October 24, 2017    John Nicole  [address] [place] [address]      Dear John,  It was a pleasure to meet you.  This is a summary of your genetic counseling visit today. Please call me with any questions.    10/24/2017    Referring Provider: Arcadio Regan MD    Presenting Information:   I met with John Nicole today for genetic counseling at the Cancer Risk Management Program at the [facility]  to discuss about the known BRCA2 mutation in his family. Specifically, the familial BRCA2 mutation is K994X ([id number]>T). He is here today to review this history, cancer screening recommendations, and available genetic testing options.    Personal History:  John is a 32 year old male.  He is healthy and does not have any personal history of cancer.       Family History: (Please see scanned pedigree for detailed family history information)    His mother was diagnosed with breast cancer at age 48.  She has a mutation in the BRCA2 gene. She is now 58.  She had a hysterectomy and her ovaries removed because of the mutation identified in the BRCA2 gene.    His sister is healthy at age 30 and she also has the same BRCA2 mutation.    There is no other family history of cancer on either the maternal or paternal side of the family.    His maternal ethnicity is Egyptian and Polish. His paternal ethnicity is Slovakian.  There is no known Ashkenazi Adventism ancestry on either side of his family.     Discussion:    We discussed the natural history and genetics of the BRCA2 gene. A detailed handout regarding the BRCA2 gene  and the information we discussed was provided to John at the end of our appointment today and can be found in the after visit summary.  Topics included: inheritance pattern, cancer risks, cancer screening recommendations, and also risks, benefits and limitations of testing.    Based on John's mother's genetic test result, John has a 50% chance of also carrying the same genetic change in the BRCA2 gene.    Based on this, John meets the National Comprehensive Cancer Network (NCCN) criteria for genetic testing of the familial BRCA2 mutation.      Genetic testing is available for: Specific Analysis of the BRCA2 gene testing for the known familial mutation, K944X.     We discussed the implications of both positive and negative test result for John.    If testing comes back positive, John will be recommended to follow the screening recommendations for men with BRCA2 mutations as published by the National Comprehensive Cancer Network (NCCN). Men with a BRCA2 mutation need to have clinical breast exams starting at age 35.  They also need to start prostate cancer screening at age 45.     If testing comes back negative, John would not be at an increased risk for BRCA2-associated cancers.     Medical Management: For John, we reviewed that the information from genetic testing may determine:    additional cancer screening for which John may qualify (i.e. clinical breast exams at age 35 and prostate cancer screening beginning at age 45.) These recommendations will be discussed in detail once genetic testing is completed.    Blood was drawn today and sent to [facility] for testing.  A consent form was also signed.      Plan:  1) Today John elected to proceed with genetic testing for the Specific Analysis of the BRCA2 gene mutation in his family.  2) This information should be available in 4-5 weeks.  3) John will return to clinic to discuss the results    Face to face time: 25 minutes    Ailin Pelayo MS  [facility]  Genetic Counselor  [phone]

## 2017-11-19 LAB — LAB SCANNED RESULT: NORMAL

## 2017-11-20 NOTE — PROGRESS NOTES
Dear Eric,    Here is a summary of your recent test results:  -Your lab test was normal.    For additional lab test information, labtestsonline.org is an excellent reference.           Thank you very much for trusting me and [facility].     Healthy regards,  Bowen Regan MD

## 2017-11-29 ENCOUNTER — ONCOLOGY VISIT (OUTPATIENT)
Dept: ONCOLOGY | Facility: CLINIC | Age: 32
End: 2017-11-29
Attending: GENETIC COUNSELOR, MS
Payer: COMMERCIAL

## 2017-11-29 DIAGNOSIS — Z80.3 FAMILY HISTORY OF MALIGNANT NEOPLASM OF BREAST: Primary | ICD-10-CM

## 2017-11-29 DIAGNOSIS — Z71.83 ENCOUNTER FOR NONPROCREATIVE GENETIC COUNSELING: ICD-10-CM

## 2017-11-29 PROCEDURE — 40000072 ZZH STATISTIC GENETIC COUNSELING, < 16 MIN: Performed by: GENETIC COUNSELOR, MS

## 2017-11-29 NOTE — LETTER
"    Cancer Risk Management  Program [facility]  [facility]  Mailing Address  Cancer Risk Management Program  [facility]  [address]  [place] [address]    New patient appointments  [phone]  November 29, 2017    John Nicole  [address]  [facility] [address]      Dear John,  This is a summary of your genetic counseling visit to discuss your genetic test result. Please contact me with any questions.    11/29/2017    Referring Provider: Arcadio Regan MD    Presenting Information:  John Nicole returned to the Cancer Risk Management Program at the [facility]  to discuss his genetic testing results. His blood was drawn on 10/24/17. The  Specific Site Analysis of BRCA2 test was ordered from [facility]. This testing was done because of his family history of breast cancer and a BRCA2 mutation.     Genetic Testing Results: NEGATIVE   John's test results were negative.  The mutation that was identified in his mother  was in the BRCA2 gene.  Specifically this mutation is called p.K944*.  John does not have the mutation previously identified in his family. This test only looked for this one mutation.    A copy of the test report can be found in the Laboratory tab, dated 10/24/17, and named \"BRCA2 SINGLE MUTATION\". The report is scanned in as a linked document.    Interpretation:  Based on this test result, John does not have hereditary breast and ovarian cancer syndrome and is not at an increased risk for the associated cancers.  Even though he does not have the familial BRCA2 mutation, he is still at general population lifetime risk for prostate cancer and male breast cancer associated cancers.         Inheritance:  We reviewed the autosomal dominant inheritance of mutations in the BRCA2 " gene.  We discussed that John did not pass on this mutation to his daughter and cannot pass on this mutation to any future children based on this test result.  Mutations in this gene do not skip generations.      Plan:  1.  I provided John with a copy of his BRCA2 test result today as well as my contact information should other family members want to consider genetic testing.   2.  If there are any further questions or concerns, he should not hesitate to contact me at .    Face to face time: 5 minutes.    Ailin Pelayo MS [facility]  Genetic Counselor  [phone]

## 2017-11-29 NOTE — MR AVS SNAPSHOT
After Visit Summary   11/29/2017    John Nicole    MRN: [id number]           Patient Information     Date Of Birth          1985        Visit Information        Provider Department      11/29/2017 8:00 AM Ailin Pelayo GC Cancer Risk Management Program        Today's Diagnoses     Family history of malignant neoplasm of breast    -  1    Encounter for nonprocreative genetic counseling           Follow-ups after your visit        Who to contact     If you have questions or need follow up information about today's clinic visit or your schedule please contact CANCER RISK MANAGEMENT PROGRAM directly at [phone].  Normal or non-critical lab and imaging results will be communicated to you by EditGridhart, letter or phone within 4 business days after the clinic has received the results. If you do not hear from us within 7 days, please contact the clinic through Electric Cloudt or phone. If you have a critical or abnormal lab result, we will notify you by phone as soon as possible.  Submit refill requests through Provender or call your pharmacy and they will forward the refill request to us. Please allow 3 business days for your refill to be completed.          Additional Information About Your Visit        MyChart Information     Provender gives you secure access to your electronic health record. If you see a primary care provider, you can also send messages to your care team and make appointments. If you have questions, please call your primary care clinic.  If you do not have a primary care provider, please call [phone] and they will assist you.        Care EveryWhere ID     This is your Care EveryWhere ID. This could be used by other organizations to access your [place] medical records  [id number]         Blood Pressure from Last 3 Encounters:   04/11/17 138/78    Weight from Last 3 Encounters:   04/11/17 93.4 kg (206 lb)              Today, you had the following     No orders found for display        Primary Care Provider Office Phone # Fax #    Arcadio Regan [phone] [address] [address]        Equal Access to Services     DARBY FUENTES : Hadii aad ku haddenisayaya Beti, wajaclynda luqadaha, qaybta kaalmada adam, job stacy bhavnasantino copeland bull hargrove. So [facility] [phone].    ATENCIÓN: Si habla español, tiene a ibarra disposición servicios gratuitos de asistencia lingüística. Llame al [phone].    We comply with applicable federal civil rights laws and [place] laws. We do not discriminate on the basis of race, color, national origin, age, disability, sex, sexual orientation, or gender identity.            Thank you!     Thank you for choosing CANCER RISK MANAGEMENT PROGRAM  for your care. Our goal is always to provide you with excellent care. Hearing back from our patients is one way we can continue to improve our services. Please take a few minutes to complete the written survey that you may receive in the mail after your visit with us. Thank you!             Your Updated Medication List - Protect others around you: Learn how to safely use, store and throw away your medicines at www.disposemymeds.org.      Notice  As of 11/29/2017  8:41 AM    You have not been prescribed any medications.

## 2017-11-29 NOTE — LETTER
"    11/29/2017         RE: John Nicole  [address] [address]        Dear Colleague,    Thank you for referring your patient, John Nicole, to the CANCER RISK MANAGEMENT PROGRAM. Please see a copy of my visit note below.    11/29/2017    Referring Provider: Arcadio Regan MD    Presenting Information:  John Nicole returned to the Cancer Risk Management Program at the [facility]  to discuss his genetic testing results. His blood was drawn on 10/24/17. The  Specific Site Analysis of BRCA2 test was ordered from [facility]. This testing was done because of his family history of breast cancer and a BRCA2 mutation.     Genetic Testing Results: NEGATIVE   John's test results were negative.  The mutation that was identified in his mother  was in the BRCA2 gene.  Specifically this mutation is called p.K944*.  John does not have the mutation previously identified in his family. This test only looked for this one mutation.    A copy of the test report can be found in the Laboratory tab, dated 10/24/17, and named \"BRCA2 SINGLE MUTATION\". The report is scanned in as a linked document.    Interpretation:  Based on this test result, John does not have hereditary breast and ovarian cancer syndrome and is not at an increased risk for the associated cancers.  Even though he does not have the familial BRCA2 mutation, he is still at general population lifetime risk for prostate cancer and male breast cancer associated cancers.         Inheritance:  We reviewed the autosomal dominant inheritance of mutations in the BRCA2 gene.  We discussed that John did not pass on this mutation to his daughter and cannot pass on this mutation to any future children based on this test result.  Mutations in this gene do not skip generations.      Plan:  1.  I provided John with a copy of his BRCA2 test result today as well as my contact information should other family members want to consider genetic testing.   2.  " If there are any further questions or concerns, he should not hesitate to contact me at .    Face to face time: 5 minutes.    Ailin Pelayo MS [facility]  Genetic Counselor  [phone]    Again, thank you for allowing me to participate in the care of your patient.        Sincerely,        Ailin Pelayo GC

## 2017-11-29 NOTE — PROGRESS NOTES
"11/29/2017    Referring Provider: Arcadio Regan MD    Presenting Information:  John Nicole returned to the Cancer Risk Management Program at the [facility]  to discuss his genetic testing results. His blood was drawn on 10/24/17. The  Specific Site Analysis of BRCA2 test was ordered from [facility]. This testing was done because of his family history of breast cancer and a BRCA2 mutation.     Genetic Testing Results: NEGATIVE   John's test results were negative.  The mutation that was identified in his mother  was in the BRCA2 gene.  Specifically this mutation is called p.K944*.  John does not have the mutation previously identified in his family. This test only looked for this one mutation.    A copy of the test report can be found in the Laboratory tab, dated 10/24/17, and named \"BRCA2 SINGLE MUTATION\". The report is scanned in as a linked document.    Interpretation:  Based on this test result, John does not have hereditary breast and ovarian cancer syndrome and is not at an increased risk for the associated cancers.  Even though he does not have the familial BRCA2 mutation, he is still at general population lifetime risk for prostate cancer and male breast cancer associated cancers.         Inheritance:  We reviewed the autosomal dominant inheritance of mutations in the BRCA2 gene.  We discussed that John did not pass on this mutation to his daughter and cannot pass on this mutation to any future children based on this test result.  Mutations in this gene do not skip generations.      Plan:  1.  I provided John with a copy of his BRCA2 test result today as well as my contact information should other family members want to consider genetic testing.   2.  If there are any further questions or concerns, he should not hesitate to contact me at .    Face to face time: 5 minutes.    Ailin Pelayo MS [facility]  Genetic Counselor  [phone]  "

## 2018-10-15 ENCOUNTER — TELEPHONE (OUTPATIENT)
Dept: FAMILY MEDICINE | Facility: CLINIC | Age: 33
End: 2018-10-15

## 2018-10-15 NOTE — TELEPHONE ENCOUNTER
"I would like to schedule an appointment with Dr. Regan as soon as I can so I may return to work. I am an  and know he is an JESI. On Wednesday, October 10, 2018, I went to the ER at [facility] in [place] with a kidney stone. I had a CT scan which showed one 3mm stone about to enter my bladder. I had one more round of intense pain after I was given this info and was told by the doctor that the stone had probably just dropped in to my bladder. The pain went away and he sent me home with two RX's: one for oxycodine which was never filled because I have had zero pain since the visit, and one RX for seven 0.4mg capsules of tamsulosin to help pass the stone. I have been urinating through a strainer since Wednesday evening and have not seen anything, nor have I had any pain.           On the afternoon of Oct 12, I contacted the ER and asked for a doctor's note to return to work. They wrote one saying I could return to work with no restrictions as of Oct 13. 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 Diagnosis (ICD-9 Code)     2. Current Status     3. Prognosis     4. Exam findings (to include ER notes, discharge summary from admission,     follow up doctor notes)     5. Use of medications (name, dosage, frequency & adverse effects you may be     experiencing from use of medications)     6. Results of any other testing including all lab results, any x-ray s and to return     to duty, imaging that shows stone has passed and not retained.     7. Treatment regimen & recommendations     8. Proposed follow-up visits\"          I called " the ER again and they laughed, said there was no way they would provide all of that, and wished me good luck with my primary doctor. Can you please help me get the information the flight surgeon needs so I can return to work? I am happy to come in at your earliest convenience. Thank you.          John Nicole     [phone]

## 2018-10-15 NOTE — PROGRESS NOTES
"  SUBJECTIVE:                                                      John Nicole is a 33 year old male who presents to clinic today for the following health issues:    ED/UC Followup:    Facility:  [facility]  Date of visit: 10/10/2018  Reason for visit: Renal Colic left side  Current Status: pt feels fine- does not know if he passed stone- no pain- still having urinary frequency    Eric inquiries about having some paperwork to clear him to return to his  job since he's doing well after passing his 3mm stone from his bladder.  Denies pain, fever or hematuria.     Problem list and histories reviewed & adjusted, as indicated.  Additional history: as documented    ROS:  Constitutional, HEENT, cardiovascular, pulmonary, GI, , musculoskeletal, neuro, skin, endocrine and psych systems are negative, except as otherwise noted.    This document serves as a record of the services and decisions personally performed and made by Arcadio Regan MD. It was created on his behalf by Dave Drummond, a trained medical scribe. The creation of this document is based the provider's statements to the medical scribe.  Scribe Dave Drummond 8:25 AM, October 16, 2018    OBJECTIVE:                                                    /80  Pulse 99  Temp 98.5  F (36.9  C) (Oral)  Ht 5' 10\" (1.778 m)  Wt 206 lb (93.4 kg)  SpO2 98%  BMI 29.56 kg/m2 Body mass index is 29.56 kg/(m^2).   GENERAL: healthy, alert, well nourished, well hydrated, no distress  HENT: ear canals- normal; TMs- normal; Nose- normal; Mouth- no ulcers, no lesions  NECK: no tenderness, no adenopathy, no asymmetry, no masses, no stiffness; thyroid- normal to palpation  RESP: lungs clear to auscultation - no rales, no rhonchi, no wheezes  CV: regular rates and rhythm, normal S1 S2, no S3 or S4 and no murmur, no click or rub -  ABDOMEN: soft, no tenderness, no  hepatosplenomegaly, no masses, normal bowel sounds  MS: extremities- no gross deformities noted, no " "edema  BACK: no CVA tenderness, no paralumbar tenderness    Diagnostic test results:  KUB - no signs of retained stone.   UA - negative     ASSESSMENT/PLAN:                                                    John was seen today for er f/u.    Diagnoses and all orders for this visit:    Nephrolithiasis - left - UA negative and CXR unremarkable, radiologist will review to confirm   Comments:  improved symptoms.   Orders:  -     XR KUB; Future  -     UA reflex to Microscopic      Risks, benefits and alternatives of treatments discussed. Plan agreed on.      Followup: Return if symptoms worsen..    See patient instructions.     BP Screening:   Last 3 BP Readings:    BP Readings from Last 3 Encounters:   10/16/18 132/80   04/11/17 138/78       The following was recommended to the patient:  Re-screen BP within a year and recommended lifestyle modifications  BMI:   Estimated body mass index is 29.56 kg/(m^2) as calculated from the following:    Height as of this encounter: 5' 10\" (1.778 m).    Weight as of this encounter: 206 lb (93.4 kg).   Weight management plan: Discussed healthy diet and exercise guidelines and patient will follow up in 12 months in clinic to re-evaluate.    The information in this document, created by the medical scribe for me, accurately reflects the services I personally performed and the decisions made by me. I have reviewed and approved this document for accuracy prior to leaving the patient care area.  8:57 AM, 10/16/18        Bowen Regan MD   Pager: [phone]    "

## 2018-10-15 NOTE — TELEPHONE ENCOUNTER
Patient is scheduled for office visit regarding this tomorrow, 10/16/2018.    Tabitha Simms, KYLAH, RN, N  [facility]) [phone]

## 2018-10-16 ENCOUNTER — OFFICE VISIT (OUTPATIENT)
Dept: FAMILY MEDICINE | Facility: CLINIC | Age: 33
End: 2018-10-16
Payer: COMMERCIAL

## 2018-10-16 ENCOUNTER — TELEPHONE (OUTPATIENT)
Dept: FAMILY MEDICINE | Facility: CLINIC | Age: 33
End: 2018-10-16

## 2018-10-16 ENCOUNTER — RADIANT APPOINTMENT (OUTPATIENT)
Dept: GENERAL RADIOLOGY | Facility: CLINIC | Age: 33
End: 2018-10-16
Attending: FAMILY MEDICINE
Payer: COMMERCIAL

## 2018-10-16 VITALS
SYSTOLIC BLOOD PRESSURE: 132 MMHG | BODY MASS INDEX: 29.49 KG/M2 | OXYGEN SATURATION: 98 % | HEART RATE: 99 BPM | HEIGHT: 70 IN | TEMPERATURE: 98.5 F | WEIGHT: 206 LBS | DIASTOLIC BLOOD PRESSURE: 80 MMHG

## 2018-10-16 DIAGNOSIS — N20.0 NEPHROLITHIASIS: ICD-10-CM

## 2018-10-16 DIAGNOSIS — Z23 ENCOUNTER FOR IMMUNIZATION: ICD-10-CM

## 2018-10-16 DIAGNOSIS — N20.0 NEPHROLITHIASIS: Primary | ICD-10-CM

## 2018-10-16 LAB
ALBUMIN UR-MCNC: NEGATIVE MG/DL
APPEARANCE UR: CLEAR
BILIRUB UR QL STRIP: NEGATIVE
COLOR UR AUTO: YELLOW
GLUCOSE UR STRIP-MCNC: NEGATIVE MG/DL
HGB UR QL STRIP: NEGATIVE
KETONES UR STRIP-MCNC: NEGATIVE MG/DL
LEUKOCYTE ESTERASE UR QL STRIP: NEGATIVE
NITRATE UR QL: NEGATIVE
PH UR STRIP: 6.5 PH (ref 5–7)
SOURCE: NORMAL
SP GR UR STRIP: 1.01 (ref 1–1.03)
UROBILINOGEN UR STRIP-ACNC: 0.2 EU/DL (ref 0.2–1)

## 2018-10-16 PROCEDURE — 74019 RADEX ABDOMEN 2 VIEWS: CPT | Mod: FY

## 2018-10-16 PROCEDURE — 90686 IIV4 VACC NO PRSV 0.5 ML IM: CPT | Performed by: FAMILY MEDICINE

## 2018-10-16 PROCEDURE — 81003 URINALYSIS AUTO W/O SCOPE: CPT | Performed by: FAMILY MEDICINE

## 2018-10-16 PROCEDURE — 99213 OFFICE O/P EST LOW 20 MIN: CPT | Mod: 25 | Performed by: FAMILY MEDICINE

## 2018-10-16 PROCEDURE — 90471 IMMUNIZATION ADMIN: CPT | Performed by: FAMILY MEDICINE

## 2018-10-16 RX ORDER — TAMSULOSIN HYDROCHLORIDE 0.4 MG/1
0.4 CAPSULE ORAL
COMMUNITY
Start: 2018-10-10 | End: 2018-10-16

## 2018-10-16 NOTE — MR AVS SNAPSHOT
After Visit Summary   10/16/2018    John Nicole    MRN: [id number]           Patient Information     Date Of Birth          1985        Visit Information        Provider Department      10/16/2018 8:20 AM Arcadio Regan MD [facility]        Today's Diagnoses     Nephrolithiasis - left     -  1    Encounter for immunization           Follow-ups after your visit        Follow-up notes from your care team     Return if symptoms worsen..      Your next 10 appointments already scheduled     Nov 09, 2018  8:40 AM CST   Office Visit with Arcadio Regan MD   [facility] ([facility]    [address] [place] [id number]   [phone]           Bring a current list of meds and any records pertaining to this visit. For Physicals, please bring immunization records and any forms needing to be filled out. Please arrive 10 minutes early to complete paperwork.              Who to contact     If you have questions or need follow up information about today's clinic visit or your schedule please contact [facility] directly at [phone].  Normal or non-critical lab and imaging results will be communicated to you by Netachart, letter or phone within 4 business days after the clinic has received the results. If you do not hear from us within 7 days, please contact the clinic through [email or web address] or phone. If you have a critical or abnormal lab result, we will notify you by phone as soon as possible.  Submit refill requests through [employer] or call your pharmacy and they will forward the refill request to us. Please allow 3 business days for your refill to be completed.          Additional Information About Your Visit        Netachart Information     [employer] gives you secure access to your electronic health record. If you see a primary care provider, you can also send messages to your care team and make appointments. If you have  "questions, please call your primary care clinic.  If you do not have a primary care provider, please call [phone] and they will assist you.        Care EveryWhere ID     This is your Care EveryWhere ID. This could be used by other organizations to access your [facility] medical records  [id number]        Your Vitals Were     Pulse Temperature Height Pulse Oximetry BMI (Body Mass Index)       99 98.5  F (36.9  C) (Oral) 5' 10\" (1.778 m) 98% 29.56 kg/m2        Blood Pressure from Last 3 Encounters:   10/16/18 132/80   04/11/17 138/78    Weight from Last 3 Encounters:   10/16/18 206 lb (93.4 kg)   04/11/17 206 lb (93.4 kg)              We Performed the Following     ADMIN 1st VACCINE     HC FLU VAC PRESRV FREE QUAD SPLIT VIR 3+YRS IM     UA reflex to Microscopic        Primary Care Provider Office Phone # Fax #    Arcadio Regan [phone]       [address] [place] [id number]        Equal Access to Services     [facility]: Hadii aad ku hadasho Soomaali, waaxda luqadaha, qaybta kaalmada adeegyada, waxay idiin hayharmony gottlieb . So [facility] [phone].    ATENCIÓN: Si habla español, tiene a ibarra disposición servicios gratuitos de asistencia lingüística. Llame al [phone].    We comply with applicable federal civil rights laws and [place] laws. We do not discriminate on the basis of race, color, national origin, age, disability, sex, sexual orientation, or gender identity.            Thank you!     Thank you for choosing [facility]  for your care. Our goal is always to provide you with excellent care. Hearing back from our patients is one way we can continue to improve our services. Please take a few minutes to complete the written survey that you may receive in the mail after your visit with us. Thank you!             Your Updated Medication List - Protect others around you: Learn how to safely use, store and throw away your medicines at " www.disposemymeds.org.      Notice  As of 10/16/2018  5:42 PM    You have not been prescribed any medications.

## 2018-10-16 NOTE — PROGRESS NOTES
Dear Eric,    Here is a summary of your recent test results:  No signs of a retained stone.  I will forward your information the the [employer].    For additional lab test information, labtestsonline.org is an excellent reference.           Thank you very much for trusting me and [facility].     Healthy regards,  Bowen Regan MD

## 2018-10-16 NOTE — LETTER
[address] [address]                                                                                                       [phone]    October 16, 2018    John Nicole  [address] [place] [address]      To Whom it May Concern:    The above patient was diagnosed with left sided kidney stone/renal colic on 10/10/2018 ( please see the attached ED note)   His CT scan showed a 3 mm stone at the ureterovesicular junction.  He was given one dose of IM Toradol in the ED and his symptoms resolved and he has had no further renal colic.  He started tamsulosin 0.4 mg daily which is well tolerated without side effects.  He was given a percocet 5/325 mg prescription but never filled it as he has been pain-free since leaving the hospital. He believes  the stone passed while in the hospital.      He was seen today for followup and continues to not have pain.  His urinalysis was normal and KUB xray does not show any signs of a retained stone.    The plan is to stop the tamsulosin and he is okay to return to full duty without restrictions.  Please contact me with questions or concerns.      Sincerely,                  Bowen Regan M.D.

## 2018-10-16 NOTE — TELEPHONE ENCOUNTER
Patient was seen today. Was told by Dr. Regan OV & Xray were going to faxed to [employer], but he called and checked. They have not received anything yet.  Can clinic send and call patient when done.  [phone] (home)   Ok to leave detailed message: yes  Thank you  Trista English

## 2018-11-08 NOTE — PROGRESS NOTES
"    SUBJECTIVE:   CC: John Nicole is an 33 year old male who presents for preventative health visit.     Healthy Habits:    Do you get at least three servings of calcium containing foods daily (dairy, green leafy vegetables, etc.)? yes    Amount of exercise or daily activities, outside of work: every other day    Problems taking medications regularly not applicable    Medication side effects: No    Have you had an eye exam in the past two years? yes    Do you see a dentist twice per year? yes    Do you have sleep apnea, excessive snoring or daytime drowsiness?no           Today's PHQ-2 Score:   PHQ-2 ( 1999 Pf[employer]) 11/9/2018   Q1: Little interest or pleasure in doing things 0   Q2: Feeling down, depressed or hopeless 0   PHQ-2 Score 0       Abuse: Current or Past(Physical, Sexual or Emotional)- No  Do you feel safe in your environment - Yes    Social History   Substance Use Topics     Smoking status: Never Smoker     Smokeless tobacco: Never Used     Alcohol use Yes      Comment: once a month 1 beer      If you drink alcohol do you typically have >3 drinks per day or >7 drinks per week? No                      Last PSA: No results found for: PSA    Reviewed orders with patient. Reviewed health maintenance and updated orders accordingly - Yes      Reviewed and updated as needed this visit by clinical staff  Tobacco  Allergies  Meds  Problems  Med Hx  Surg Hx  Fam Hx  Soc Hx          Reviewed and updated as needed this visit by Provider  Allergies  Meds  Problems  Fam Hx            ROS:  Constitutional, HEENT, cardiovascular, pulmonary, GI, , musculoskeletal, neuro, skin, endocrine and psych systems are negative, except as otherwise noted.     OBJECTIVE:   /88  Pulse 95  Temp 98.3  F (36.8  C) (Oral)  Ht 5' 10\" (1.778 m)  Wt 206 lb (93.4 kg)  SpO2 99%  BMI 29.56 kg/m2  EXAM:  GENERAL: healthy, alert and no distress  EYES: Eyes grossly normal to inspection, PERRL and conjunctivae and sclerae " "normal  HENT: ear canals and TM's normal, nose and mouth without ulcers or lesions  NECK: no adenopathy, no asymmetry, masses, or scars and thyroid normal to palpation  RESP: lungs clear to auscultation - no rales, rhonchi or wheezes  BREAST: normal without masses, tenderness or nipple discharge and no palpable axillary masses or adenopathy  CV: regular rate and rhythm, normal S1 S2, no S3 or S4, no murmur, click or rub, no peripheral edema and peripheral pulses strong  ABDOMEN: soft, nontender, no hepatosplenomegaly, no masses and bowel sounds normal   (male): normal male genitalia without lesions or urethral discharge, no hernia  RECTAL: normal sphincter tone, no rectal masses, prostate normal size, smooth, nontender without nodules or masses  MS: no gross musculoskeletal defects noted, no edema  SKIN: no suspicious lesions or rashes  NEURO: Normal strength and tone, mentation intact and speech normal  PSYCH: mentation appears normal, affect normal/bright  LYMPH: no cervical, supraclavicular, axillary, or inguinal adenopathy    ASSESSMENT/PLAN:   John was seen today for physical.    Diagnoses and all orders for this visit:    Routine general medical examination at a health care facility    Nephrolithiasis - resolved    Need for prophylactic vaccination with tetanus-diphtheria (Td)  -     TD (ADULT, 7+) PRESERVE FREE  -          ADMIN VACCINE, FIRST    Family history of BRCA2 gene positive    Screening for diabetes mellitus  -     Basic metabolic panel    Screening for deficiency anemia  -     CBC with platelets    Screening for lipid disorders  -     Lipid panel reflex to direct LDL Fasting        COUNSELING:  Reviewed preventive health counseling, as reflected in patient instructions    BP Readings from Last 1 Encounters:   11/09/18 132/88     Estimated body mass index is 29.56 kg/(m^2) as calculated from the following:    Height as of this encounter: 5' 10\" (1.778 m).    Weight as of this encounter: 206 lb " (93.4 kg).      Weight management plan: Discussed healthy diet and exercise guidelines and patient will follow up in 6 months in clinic to re-evaluate.     reports that he has never smoked. He has never used smokeless tobacco.      Counseling Resources:  ATP IV Guidelines  Pooled Cohorts Equation Calculator  FRAX Risk Assessment  ICSI Preventive Guidelines  Dietary Guidelines for Americans, 2010  USDA's MyPlate  ASA Prophylaxis  Lung CA Screening    Arcadio Regan MD  [facility]

## 2018-11-08 NOTE — PATIENT INSTRUCTIONS
Preventive Health Recommendations  Male Ages 26 - 39    Yearly exam:             See your health care provider every year in order to  o   Review health changes.   o   Discuss preventive care.    o   Review your medicines if your doctor has prescribed any.    You should be tested each year for STDs (sexually transmitted diseases), if you re at risk.     After age 35, talk to your provider about cholesterol testing. If you are at risk for heart disease, have your cholesterol tested at least every 5 years.     If you are at risk for diabetes, you should have a diabetes test (fasting glucose).  Shots: Get a flu shot each year. Get a tetanus shot every 10 years.     Nutrition:    Eat at least 5 servings of fruits and vegetables daily.     Eat whole-grain bread, whole-wheat pasta and brown rice instead of white grains and rice.     Get adequate Calcium and Vitamin D.     Lifestyle    Exercise for at least 150 minutes a week (30 minutes a day, 5 days a week). This will help you control your weight and prevent disease.     Limit alcohol to one drink per day.     No smoking.     Wear sunscreen to prevent skin cancer.     See your dentist every six months for an exam and cleaning.   Dietary Approaches to Stop Hypertension (The DASH Diet)   What is hypertension?   Hypertension is blood pressure that keeps being higher than normal. Blood pressure is the force of blood against artery walls as the heart pumps blood through the body. Blood pressure can be unhealthy if it is above 120/80. The higher your blood pressure, the greater the health risk.   High blood pressure can be controlled if you take these steps:   Maintain a healthy weight.   Are physically active.   Follow a healthy eating plan, which includes foods that do not have a lot of salt and sodium.   Do not drink a lot of alcohol.   Diet affects high blood pressure. Following the DASH diet and reducing the amount of sodium in your diet will help lower your blood  pressure. It will also help prevent high blood pressure.   What is the DASH diet?   Dietary Approaches to Stop Hypertension (DASH) is a diet that is low in saturated fat, cholesterol, and total fat. It emphasizes fruits, vegetables, and low-fat dairy foods. The DASH diet also includes whole-grain products, fish, poultry, and nuts. It encourages fewer servings of red meat, sweets, and sugar-containing beverages. It is rich in magnesium, potassium, and calcium, as well as protein and fiber.   How do I get started on the DASH diet?   The DASH diet requires no special foods and has no hard-to-follow recipes. Start by seeing how DASH compares with your current eating habits.   The DASH eating plan illustrated below is based on a diet of 2,000 calories a day. Your healthcare provider or a dietitian can help you determine how many calories a day you need. Most adults need somewhere between 1600 and 2800 calories a day. Serving sizes for different foods vary from 1/2 cup to 1 and 1/4 cups. Check product nutrition labels for serving sizes and the number of calories per serving.                      Number of        Examples of  Food Group      servings         serving size  ----------------------------------------------------------------    Grains and      7 to 8 per day   1 slice of bread  grain products                   1 cup ready-to-eat cold cereal                                   1/2 cup cooked rice, pasta,                                   or cereal    Vegetables      4 to 5 per day   1 cup raw leafy vegetable                                   1/2 cup cooked vegetable                                   6 ounces (oz) vegetable juice      Fruits          4 to 5 per day   1 medium fruit                                   1/4 cup dried fruit                                   1/2 cup fresh, frozen, or                                   canned fruit                                   6 oz fruit juice      Low-fat or      2 to  3 per day   8 oz milk  fat-free                         1 cup yogurt  dairy foods                      1 and 1/2 oz cheese    Lean meats,  poultry,        2 or fewer per   3 oz cooked lean meat,  or fish         day              skinless poultry, or fish    Nuts, seeds,    4 to 5 per week  1/3 cup or 1 and 1/2 oz nuts  and dry beans                    1 tablespoon or 1/2 oz seeds                                   1/2 cup cooked dry beans    Fats and oils   2 to 3 per day   1 teaspoon soft margarine                                   1 tablespoon low-fat mayonnaise                                   2 tablespoons light salad                                   dressing                                   1 teaspoon vegetable oil    Sweets          5 per week       1 tablespoon sugar                                   1 tablespoon jelly or jam                                   1/2 oz jelly beans                                   8 oz lemonade  ----------------------------------------------------------------  Make changes gradually. Here are some suggestions that might help:   If you now eat 1 or 2 servings of vegetables a day, add a serving at lunch and another at dinner.   If you have not been eating fruit regularly, or have only juice at breakfast, add a serving to your meals or have it as a snack.   Drink milk or water with lunch or dinner instead of soda, sugar-sweetened tea, or alcohol. Choose low-fat (1%) or fat-free (nonfat) dairy products so that you are eating fewer calories and less saturated fat, total fat, and cholesterol.   Read food labels on margarines and salad dressings to choose products lowest in fat.   If you now eat large portions of meat, slowly cut back--by a half or a third at each meal. Limit meat to 6 ounces a day (two 3-ounce servings). Three to 4 ounces is about the size of a deck of cards.   Have 2 or more meatless meals each week. Increase servings of vegetables, rice, pasta, and beans in all  meals. Try casseroles, pasta, and stir-fuentes dishes, which have less meat and more vegetables, grains, and beans.   Use fruits canned in their own juice. Fresh fruits require little or no preparation. Dried fruits are a good choice to carry with you or to have ready in the car.   Try these snacks ideas: unsalted pretzels or nuts mixed with raisins, matthew crackers, low-fat and fat-free yogurt or frozen yogurt, popcorn with no salt or butter added, and raw vegetables.   Choose whole-grain foods to get more nutrients, including minerals and fiber. For example, choose whole-wheat bread, whole-grain cereals, or brown rice.   Use fresh, frozen, or no-salt-added canned vegetables.   Remember to also reduce the salt and sodium in your diet. Try to have no more than 2000 milligrams (mg) of sodium per day, with a goal of further reducing the sodium to 1500 mg per day. Three important ways to reduce sodium are:   Eat food products with reduced-sodium or no salt added.   Use less salt when you prepare foods and do not add salt to your food at the table.   Read food labels. Aim for foods that contain less than 5% of the daily value of sodium.   The DASH eating plan is not designed for weight loss. But it contains many lower-calorie foods, such as fruits and vegetables. You can make it lower in calories by replacing high-calorie foods with more fruits and vegetables. Some ideas to increase fruits and vegetables and decrease calories include:   Eat a medium apple instead of 4 shortbread cookies. You'll save 80 calories.   Eat 1/4 cup of dried apricots instead of a 2-ounce bag of pork rinds. You'll save 230 calories.   Have a hamburger that's 3 ounces instead of 6 ounces. Add a 1/2 cup serving of carrots and a 1/2 cup serving of spinach. You'll save more than 200 calories.   Instead of 5 ounces of chicken, have a stir fuentes with 2 ounces of chicken and 1 and 1/2 cups of raw vegetables. Use just a small amount of vegetable oil. You'll  save 50 calories.   Have a 1/2 cup serving of low-fat frozen yogurt instead of a 1-and-1/2-ounce chocolate bar. You'll save about 110 calories.   Use low-fat or fat-free condiments, such as fat-free salad dressings.   Eat smaller portions. Cut back gradually.   Use food labels to compare fat and calorie content in packaged foods. Items marked low fat or fat free may be lower in fat but not lower in calories than their regular versions.   Limit foods with lots of added sugar, such as pies, flavored yogurts, candy bars, ice cream, sherbet, regular soft drinks, and fruit drinks.   Drink water or club soda instead of cola or other soda drinks.   For more information, see the National Heart, Lung, and Blood [place] Web site at: http://www.nhlbi.nih.gov/health/public/heart/hbp/dash/.   Based on National Institutes of Health guidelines.   Published by [facility].   Last modified: 2008-08-11   Last reviewed: 2008-11-02

## 2018-11-09 ENCOUNTER — OFFICE VISIT (OUTPATIENT)
Dept: FAMILY MEDICINE | Facility: CLINIC | Age: 33
End: 2018-11-09
Payer: COMMERCIAL

## 2018-11-09 VITALS
OXYGEN SATURATION: 99 % | SYSTOLIC BLOOD PRESSURE: 132 MMHG | TEMPERATURE: 98.3 F | HEIGHT: 70 IN | WEIGHT: 206 LBS | BODY MASS INDEX: 29.49 KG/M2 | HEART RATE: 95 BPM | DIASTOLIC BLOOD PRESSURE: 88 MMHG

## 2018-11-09 DIAGNOSIS — Z13.0 SCREENING FOR DEFICIENCY ANEMIA: ICD-10-CM

## 2018-11-09 DIAGNOSIS — N20.0 NEPHROLITHIASIS: ICD-10-CM

## 2018-11-09 DIAGNOSIS — Z84.81 FAMILY HISTORY OF BRCA2 GENE POSITIVE: ICD-10-CM

## 2018-11-09 DIAGNOSIS — Z13.1 SCREENING FOR DIABETES MELLITUS: ICD-10-CM

## 2018-11-09 DIAGNOSIS — Z13.220 SCREENING FOR LIPID DISORDERS: ICD-10-CM

## 2018-11-09 DIAGNOSIS — Z23 NEED FOR PROPHYLACTIC VACCINATION WITH TETANUS-DIPHTHERIA (TD): ICD-10-CM

## 2018-11-09 DIAGNOSIS — Z00.00 ROUTINE GENERAL MEDICAL EXAMINATION AT A HEALTH CARE FACILITY: Primary | ICD-10-CM

## 2018-11-09 PROBLEM — E78.5 HYPERLIPIDEMIA LDL GOAL <130: Status: ACTIVE | Noted: 2017-04-11

## 2018-11-09 PROBLEM — Z71.83 ENCOUNTER FOR NONPROCREATIVE GENETIC COUNSELING: Status: RESOLVED | Noted: 2017-11-29 | Resolved: 2018-11-09

## 2018-11-09 LAB
ANION GAP SERPL CALCULATED.3IONS-SCNC: 6 MMOL/L (ref 3–14)
BUN SERPL-MCNC: 12 MG/DL (ref 7–30)
CALCIUM SERPL-MCNC: 9.3 MG/DL (ref 8.5–10.1)
CHLORIDE SERPL-SCNC: 102 MMOL/L (ref 94–109)
CHOLEST SERPL-MCNC: 259 MG/DL
CO2 SERPL-SCNC: 28 MMOL/L (ref 20–32)
CREAT SERPL-MCNC: 1.03 MG/DL (ref 0.66–1.25)
ERYTHROCYTE [DISTWIDTH] IN BLOOD BY AUTOMATED COUNT: 12 % (ref 10–15)
GFR SERPL CREATININE-BSD FRML MDRD: 82 ML/MIN/1.7M2
GLUCOSE SERPL-MCNC: 106 MG/DL (ref 70–99)
HCT VFR BLD AUTO: 47.1 % (ref 40–53)
HDLC SERPL-MCNC: 61 MG/DL
HGB BLD-MCNC: 15.6 G/DL (ref 13.3–17.7)
LDLC SERPL CALC-MCNC: 186 MG/DL
MCH RBC QN AUTO: 29.9 PG (ref 26.5–33)
MCHC RBC AUTO-ENTMCNC: 33.1 G/DL (ref 31.5–36.5)
MCV RBC AUTO: 90 FL (ref 78–100)
NONHDLC SERPL-MCNC: 198 MG/DL
PLATELET # BLD AUTO: 248 10E9/L (ref 150–450)
POTASSIUM SERPL-SCNC: 4.1 MMOL/L (ref 3.4–5.3)
RBC # BLD AUTO: 5.21 10E12/L (ref 4.4–5.9)
SODIUM SERPL-SCNC: 136 MMOL/L (ref 133–144)
TRIGL SERPL-MCNC: 59 MG/DL
WBC # BLD AUTO: 6.1 10E9/L (ref 4–11)

## 2018-11-09 PROCEDURE — 90471 IMMUNIZATION ADMIN: CPT | Performed by: FAMILY MEDICINE

## 2018-11-09 PROCEDURE — 80061 LIPID PANEL: CPT | Performed by: FAMILY MEDICINE

## 2018-11-09 PROCEDURE — 90714 TD VACC NO PRESV 7 YRS+ IM: CPT | Performed by: FAMILY MEDICINE

## 2018-11-09 PROCEDURE — 85027 COMPLETE CBC AUTOMATED: CPT | Performed by: FAMILY MEDICINE

## 2018-11-09 PROCEDURE — 36415 COLL VENOUS BLD VENIPUNCTURE: CPT | Performed by: FAMILY MEDICINE

## 2018-11-09 PROCEDURE — 99395 PREV VISIT EST AGE 18-39: CPT | Mod: 25 | Performed by: FAMILY MEDICINE

## 2018-11-09 PROCEDURE — 80048 BASIC METABOLIC PNL TOTAL CA: CPT | Performed by: FAMILY MEDICINE

## 2018-11-09 RX ORDER — MULTIPLE VITAMINS W/ MINERALS TAB 9MG-400MCG
1 TAB ORAL DAILY
COMMUNITY

## 2018-11-09 NOTE — MR AVS SNAPSHOT
After Visit Summary   11/9/2018    John Nicole    MRN: [id number]           Patient Information     Date Of Birth          1985        Visit Information        Provider Department      11/9/2018 8:40 AM Arcadio Regan MD [facility] [place]        Today's Diagnoses     Routine general medical examination at a health care facility    -  1    Nephrolithiasis        Need for prophylactic vaccination with tetanus-diphtheria (Td)        Family history of BRCA2 gene positive        Screening for diabetes mellitus        Screening for deficiency anemia        Screening for lipid disorders          Care Instructions      Preventive Health Recommendations  Male Ages 26 - 39    Yearly exam:             See your health care provider every year in order to  o   Review health changes.   o   Discuss preventive care.    o   Review your medicines if your doctor has prescribed any.    You should be tested each year for STDs (sexually transmitted diseases), if you re at risk.     After age 35, talk to your provider about cholesterol testing. If you are at risk for heart disease, have your cholesterol tested at least every 5 years.     If you are at risk for diabetes, you should have a diabetes test (fasting glucose).  Shots: Get a flu shot each year. Get a tetanus shot every 10 years.     Nutrition:    Eat at least 5 servings of fruits and vegetables daily.     Eat whole-grain bread, whole-wheat pasta and brown rice instead of white grains and rice.     Get adequate Calcium and Vitamin D.     Lifestyle    Exercise for at least 150 minutes a week (30 minutes a day, 5 days a week). This will help you control your weight and prevent disease.     Limit alcohol to one drink per day.     No smoking.     Wear sunscreen to prevent skin cancer.     See your dentist every six months for an exam and cleaning.   Dietary Approaches to Stop Hypertension (The DASH Diet)   What is hypertension?   Hypertension is blood  pressure that keeps being higher than normal. Blood pressure is the force of blood against artery walls as the heart pumps blood through the body. Blood pressure can be unhealthy if it is above 120/80. The higher your blood pressure, the greater the health risk.   High blood pressure can be controlled if you take these steps:   Maintain a healthy weight.   Are physically active.   Follow a healthy eating plan, which includes foods that do not have a lot of salt and sodium.   Do not drink a lot of alcohol.   Diet affects high blood pressure. Following the DASH diet and reducing the amount of sodium in your diet will help lower your blood pressure. It will also help prevent high blood pressure.   What is the DASH diet?   Dietary Approaches to Stop Hypertension (DASH) is a diet that is low in saturated fat, cholesterol, and total fat. It emphasizes fruits, vegetables, and low-fat dairy foods. The DASH diet also includes whole-grain products, fish, poultry, and nuts. It encourages fewer servings of red meat, sweets, and sugar-containing beverages. It is rich in magnesium, potassium, and calcium, as well as protein and fiber.   How do I get started on the DASH diet?   The DASH diet requires no special foods and has no hard-to-follow recipes. Start by seeing how DASH compares with your current eating habits.   The DASH eating plan illustrated below is based on a diet of 2,000 calories a day. Your healthcare provider or a dietitian can help you determine how many calories a day you need. Most adults need somewhere between 1600 and 2800 calories a day. Serving sizes for different foods vary from 1/2 cup to 1 and 1/4 cups. Check product nutrition labels for serving sizes and the number of calories per serving.                      Number of        Examples of  Food Group      servings         serving size  ----------------------------------------------------------------    Grains and      7 to 8 per day   1 slice of  bread  grain products                   1 cup ready-to-eat cold cereal                                   1/2 cup cooked rice, pasta,                                   or cereal    Vegetables      4 to 5 per day   1 cup raw leafy vegetable                                   1/2 cup cooked vegetable                                   6 ounces (oz) vegetable juice      Fruits          4 to 5 per day   1 medium fruit                                   1/4 cup dried fruit                                   1/2 cup fresh, frozen, or                                   canned fruit                                   6 oz fruit juice      Low-fat or      2 to 3 per day   8 oz milk  fat-free                         1 cup yogurt  dairy foods                      1 and 1/2 oz cheese    Lean meats,  poultry,        2 or fewer per   3 oz cooked lean meat,  or fish         day              skinless poultry, or fish    Nuts, seeds,    4 to 5 per week  1/3 cup or 1 and 1/2 oz nuts  and dry beans                    1 tablespoon or 1/2 oz seeds                                   1/2 cup cooked dry beans    Fats and oils   2 to 3 per day   1 teaspoon soft margarine                                   1 tablespoon low-fat mayonnaise                                   2 tablespoons light salad                                   dressing                                   1 teaspoon vegetable oil    Sweets          5 per week       1 tablespoon sugar                                   1 tablespoon jelly or jam                                   1/2 oz jelly beans                                   8 oz lemonade  ----------------------------------------------------------------  Make changes gradually. Here are some suggestions that might help:   If you now eat 1 or 2 servings of vegetables a day, add a serving at lunch and another at dinner.   If you have not been eating fruit regularly, or have only juice at breakfast, add a serving to your meals or  have it as a snack.   Drink milk or water with lunch or dinner instead of soda, sugar-sweetened tea, or alcohol. Choose low-fat (1%) or fat-free (nonfat) dairy products so that you are eating fewer calories and less saturated fat, total fat, and cholesterol.   Read food labels on margarines and salad dressings to choose products lowest in fat.   If you now eat large portions of meat, slowly cut back--by a half or a third at each meal. Limit meat to 6 ounces a day (two 3-ounce servings). Three to 4 ounces is about the size of a deck of cards.   Have 2 or more meatless meals each week. Increase servings of vegetables, rice, pasta, and beans in all meals. Try casseroles, pasta, and stir-fuentes dishes, which have less meat and more vegetables, grains, and beans.   Use fruits canned in their own juice. Fresh fruits require little or no preparation. Dried fruits are a good choice to carry with you or to have ready in the car.   Try these snacks ideas: unsalted pretzels or nuts mixed with raisins, matthew crackers, low-fat and fat-free yogurt or frozen yogurt, popcorn with no salt or butter added, and raw vegetables.   Choose whole-grain foods to get more nutrients, including minerals and fiber. For example, choose whole-wheat bread, whole-grain cereals, or brown rice.   Use fresh, frozen, or no-salt-added canned vegetables.   Remember to also reduce the salt and sodium in your diet. Try to have no more than 2000 milligrams (mg) of sodium per day, with a goal of further reducing the sodium to 1500 mg per day. Three important ways to reduce sodium are:   Eat food products with reduced-sodium or no salt added.   Use less salt when you prepare foods and do not add salt to your food at the table.   Read food labels. Aim for foods that contain less than 5% of the daily value of sodium.   The DASH eating plan is not designed for weight loss. But it contains many lower-calorie foods, such as fruits and vegetables. You can make it  lower in calories by replacing high-calorie foods with more fruits and vegetables. Some ideas to increase fruits and vegetables and decrease calories include:   Eat a medium apple instead of 4 shortbread cookies. You'll save 80 calories.   Eat 1/4 cup of dried apricots instead of a 2-ounce bag of pork rinds. You'll save 230 calories.   Have a hamburger that's 3 ounces instead of 6 ounces. Add a 1/2 cup serving of carrots and a 1/2 cup serving of spinach. You'll save more than 200 calories.   Instead of 5 ounces of chicken, have a stir fuentes with 2 ounces of chicken and 1 and 1/2 cups of raw vegetables. Use just a small amount of vegetable oil. You'll save 50 calories.   Have a 1/2 cup serving of low-fat frozen yogurt instead of a 1-and-1/2-ounce chocolate bar. You'll save about 110 calories.   Use low-fat or fat-free condiments, such as fat-free salad dressings.   Eat smaller portions. Cut back gradually.   Use food labels to compare fat and calorie content in packaged foods. Items marked low fat or fat free may be lower in fat but not lower in calories than their regular versions.   Limit foods with lots of added sugar, such as pies, flavored yogurts, candy bars, ice cream, sherbet, regular soft drinks, and fruit drinks.   Drink water or club soda instead of cola or other soda drinks.   For more information, see the National Heart, Lung, and Blood [place] Web site at: http://www.nhlbi.nih.gov/health/public/heart/hbp/dash/.   Based on National Institutes of Health guidelines.   Published by [facility].   Last modified: 2008-08-11   Last reviewed: 2008-11-02             Follow-ups after your visit        Follow-up notes from your care team     Return in about 1 year (around 11/9/2019) for Wellness Exam and fasting labs.      Who to contact     If you have questions or need follow up information about today's clinic visit or your schedule please contact [facility] directly at [phone].  Normal or  "non-critical lab and imaging results will be communicated to you by MyChart, letter or phone within 4 business days after the clinic has received the results. If you do not hear from us within 7 days, please contact the clinic through Blinkivers[employer] or phone. If you have a critical or abnormal lab result, we will notify you by phone as soon as possible.  Submit refill requests through Phone2Action or call your pharmacy and they will forward the refill request to us. Please allow 3 business days for your refill to be completed.          Additional Information About Your Visit        Phone2Action Information     Phone2Action gives you secure access to your electronic health record. If you see a primary care provider, you can also send messages to your care team and make appointments. If you have questions, please call your primary care clinic.  If you do not have a primary care provider, please call [phone] and they will assist you.        Care EveryWhere ID     This is your Care EveryWhere ID. This could be used by other organizations to access your [place] medical records  [id number]        Your Vitals Were     Pulse Temperature Height Pulse Oximetry BMI (Body Mass Index)       95 98.3  F (36.8  C) (Oral) 5' 10\" (1.778 m) 99% 29.56 kg/m2        Blood Pressure from Last 3 Encounters:   11/09/18 132/88   10/16/18 132/80   04/11/17 138/78    Weight from Last 3 Encounters:   11/09/18 206 lb (93.4 kg)   10/16/18 206 lb (93.4 kg)   04/11/17 206 lb (93.4 kg)              We Performed the Following          ADMIN VACCINE, FIRST     Basic metabolic panel     CBC with platelets     Lipid panel reflex to direct LDL Fasting     TD (ADULT, 7+) PRESERVE FREE        Primary Care Provider Office Phone # Fax #    Arcadio Regan [phone]       [address] [place] [id number]        Equal Access to Services     DARBY FUENTES AH: Remigio Bang, wajaclynda luqadaha, qaybta kaalmada adam, job spring " regis hugginssantino irinaankit lafantalashell delvin. So [facility] [phone].    ATENCIÓN: Si habla flakoañol, tiene a ibarra disposición servicios gratuitos de asistencia lingüística. Magui al [phone].    We comply with applicable federal civil rights laws and [place] laws. We do not discriminate on the basis of race, color, national origin, age, disability, sex, sexual orientation, or gender identity.            Thank you!     Thank you for choosing [facility]  for your care. Our goal is always to provide you with excellent care. Hearing back from our patients is one way we can continue to improve our services. Please take a few minutes to complete the written survey that you may receive in the mail after your visit with us. Thank you!             Your Updated Medication List - Protect others around you: Learn how to safely use, store and throw away your medicines at www.disposemymeds.org.          This list is accurate as of 11/9/18  9:07 AM.  Always use your most recent med list.                   Brand Name Dispense Instructions for use Diagnosis    CHOLEST OFF PO           Multi-vitamin Tabs tablet      Take 1 tablet by mouth daily

## 2018-11-10 NOTE — PROGRESS NOTES
Dear Eric,    Here is a summary of your recent test results:  -Kidney function is normal (Cr, GFR), Sodium is normal, Potassium is normal, Calcium is normal, Glucose is normal.   -LDL(bad) cholesterol level is elevated,  A diet high in fat and simple carbohydrates, genetics and being overweight can contribute to this. ADVISE: Exercise, a low fat, low carbohydrate diet and weight control are helpful to improve this.  Rechecking your fasting cholesterol panel in 3 months as discussed is recommended  -Normal red blood cell (hgb) levels, normal white blood cell count and normal platelet levels.    For additional lab test information, [email or web address].org is an excellent reference.           Thank you very much for trusting me and [facility].     Healthy regards,  Bowen Regan MD

## 2019-05-24 DIAGNOSIS — Z13.220 SCREENING FOR LIPID DISORDERS: ICD-10-CM

## 2019-05-24 LAB
CHOLEST SERPL-MCNC: 241 MG/DL
HDLC SERPL-MCNC: 61 MG/DL
LDLC SERPL CALC-MCNC: 170 MG/DL
NONHDLC SERPL-MCNC: 180 MG/DL
TRIGL SERPL-MCNC: 52 MG/DL

## 2019-05-24 PROCEDURE — 80061 LIPID PANEL: CPT | Performed by: FAMILY MEDICINE

## 2019-05-24 PROCEDURE — 36415 COLL VENOUS BLD VENIPUNCTURE: CPT | Performed by: FAMILY MEDICINE

## 2019-05-24 NOTE — RESULT ENCOUNTER NOTE
Dear Eric,    Here is a summary of your recent test results:  -LDL(bad) cholesterol level is elevated still (but improved) which can increase your heart disease risk.  A diet high in fat and simple carbohydrates, genetics and being overweight can contribute to this. ADVISE: exercising 150 minutes of aerobic exercise per week (30 minutes for 5 days per week or 50 minutes for 3 days per week are options) and eating a low saturated fat/low carbohydrate diet are helpful to improve this. In 6 months, you should recheck your fasting cholesterol panel by scheduling a lab-only appointment.    For additional lab test information, [email or web address] is an excellent reference.           Thank you very much for trusting me and [facility].     Healthy regards,  Bowen Regan MD

## 2019-12-13 ENCOUNTER — OFFICE VISIT (OUTPATIENT)
Dept: FAMILY MEDICINE | Facility: CLINIC | Age: 34
End: 2019-12-13
Payer: COMMERCIAL

## 2019-12-13 VITALS
BODY MASS INDEX: 29.2 KG/M2 | HEART RATE: 85 BPM | SYSTOLIC BLOOD PRESSURE: 136 MMHG | OXYGEN SATURATION: 99 % | TEMPERATURE: 98 F | WEIGHT: 204 LBS | HEIGHT: 70 IN | DIASTOLIC BLOOD PRESSURE: 90 MMHG

## 2019-12-13 DIAGNOSIS — E78.5 HYPERLIPIDEMIA LDL GOAL <130: ICD-10-CM

## 2019-12-13 DIAGNOSIS — Z00.00 ROUTINE GENERAL MEDICAL EXAMINATION AT A HEALTH CARE FACILITY: Primary | ICD-10-CM

## 2019-12-13 DIAGNOSIS — Z13.0 SCREENING FOR DEFICIENCY ANEMIA: ICD-10-CM

## 2019-12-13 DIAGNOSIS — Z84.81 FAMILY HISTORY OF BRCA2 GENE POSITIVE: ICD-10-CM

## 2019-12-13 DIAGNOSIS — R03.0 ELEVATED BLOOD PRESSURE READING WITHOUT DIAGNOSIS OF HYPERTENSION: ICD-10-CM

## 2019-12-13 DIAGNOSIS — Z13.1 SCREENING FOR DIABETES MELLITUS: ICD-10-CM

## 2019-12-13 LAB
ANION GAP SERPL CALCULATED.3IONS-SCNC: 5 MMOL/L (ref 3–14)
BUN SERPL-MCNC: 13 MG/DL (ref 7–30)
CALCIUM SERPL-MCNC: 9.6 MG/DL (ref 8.5–10.1)
CHLORIDE SERPL-SCNC: 105 MMOL/L (ref 94–109)
CHOLEST SERPL-MCNC: 264 MG/DL
CO2 SERPL-SCNC: 28 MMOL/L (ref 20–32)
CREAT SERPL-MCNC: 0.94 MG/DL (ref 0.66–1.25)
ERYTHROCYTE [DISTWIDTH] IN BLOOD BY AUTOMATED COUNT: 12 % (ref 10–15)
GFR SERPL CREATININE-BSD FRML MDRD: >90 ML/MIN/{1.73_M2}
GLUCOSE SERPL-MCNC: 99 MG/DL (ref 70–99)
HCT VFR BLD AUTO: 47.6 % (ref 40–53)
HDLC SERPL-MCNC: 67 MG/DL
HGB BLD-MCNC: 15.8 G/DL (ref 13.3–17.7)
LDLC SERPL CALC-MCNC: 188 MG/DL
MCH RBC QN AUTO: 29.9 PG (ref 26.5–33)
MCHC RBC AUTO-ENTMCNC: 33.2 G/DL (ref 31.5–36.5)
MCV RBC AUTO: 90 FL (ref 78–100)
NONHDLC SERPL-MCNC: 197 MG/DL
PLATELET # BLD AUTO: 261 10E9/L (ref 150–450)
POTASSIUM SERPL-SCNC: 3.9 MMOL/L (ref 3.4–5.3)
RBC # BLD AUTO: 5.29 10E12/L (ref 4.4–5.9)
SODIUM SERPL-SCNC: 138 MMOL/L (ref 133–144)
TRIGL SERPL-MCNC: 43 MG/DL
WBC # BLD AUTO: 6.6 10E9/L (ref 4–11)

## 2019-12-13 PROCEDURE — 85027 COMPLETE CBC AUTOMATED: CPT | Performed by: FAMILY MEDICINE

## 2019-12-13 PROCEDURE — 82043 UR ALBUMIN QUANTITATIVE: CPT | Performed by: FAMILY MEDICINE

## 2019-12-13 PROCEDURE — 80048 BASIC METABOLIC PNL TOTAL CA: CPT | Performed by: FAMILY MEDICINE

## 2019-12-13 PROCEDURE — 80061 LIPID PANEL: CPT | Performed by: FAMILY MEDICINE

## 2019-12-13 PROCEDURE — 99395 PREV VISIT EST AGE 18-39: CPT | Performed by: FAMILY MEDICINE

## 2019-12-13 PROCEDURE — 36415 COLL VENOUS BLD VENIPUNCTURE: CPT | Performed by: FAMILY MEDICINE

## 2019-12-13 ASSESSMENT — MIFFLIN-ST. JEOR: SCORE: 1861.59

## 2019-12-13 NOTE — PROGRESS NOTES
3  SUBJECTIVE:   CC: John Nicole is an 34 year old male who presents for preventive health visit.     Healthy Habits:    Do you get at least three servings of calcium containing foods daily (dairy, green leafy vegetables, etc.)? yes    Amount of exercise or daily activities, outside of work: 3-5 day(s) per week    Problems taking medications regularly not applicable    Medication side effects: No    Have you had an eye exam in the past two years? yes    Do you see a dentist twice per year? yes    Do you have sleep apnea, excessive snoring or daytime drowsiness?no    Elevated Blood Pressure  Denies: Headaches, Chest Pain.    Sleeping   He is noticing his extremities are getting numb more frequently while sleeping. He feels rested. Job changes hours every week. He does not stop breathing.     Today's PHQ-2 Score:   PHQ-2 ( 1999 Pf[employer]) 12/13/2019 11/9/2018   Q1: Little interest or pleasure in doing things 0 0   Q2: Feeling down, depressed or hopeless 0 0   PHQ-2 Score 0 0     Abuse: Current or Past(Physical, Sexual or Emotional)- No  Do you feel safe in your environment? Yes    Social History     Tobacco Use     Smoking status: Never Smoker     Smokeless tobacco: Never Used   Substance Use Topics     Alcohol use: Yes     Comment: once a month 1 beer     If you drink alcohol do you typically have >3 drinks per day or >7 drinks per week? No                      Last PSA: No results found for: PSA    Reviewed orders with patient. Reviewed health maintenance and updated orders accordingly - Yes    Reviewed and updated as needed this visit by clinical staff  Tobacco  Allergies  Meds  Problems  Med Hx  Surg Hx  Fam Hx  Soc Hx          Reviewed and updated as needed this visit by Provider  Tobacco  Allergies  Meds  Problems  Med Hx  Surg Hx  Fam Hx          ROS:  Constitutional, HEENT, cardiovascular, pulmonary, GI, , musculoskeletal, neuro, skin, endocrine and psych systems are negative, except as  "otherwise noted.  This document serves as a record of the services and decisions personally performed and made by Arcadio Regan MD. It was created on his behalf by Sean Mitchell, a trained medical scribe. The creation of this document is based the provider's statements to the medical scribe.  Scribyo Mitchell 11:42 AM, December 13, 2019    OBJECTIVE:   BP (!) 136/90   Pulse 85   Temp 98  F (36.7  C) (Oral)   Ht 1.778 m (5' 10\")   Wt 92.5 kg (204 lb)   SpO2 99%   BMI 29.27 kg/m    EXAM:  GENERAL: healthy, alert and no distress  EYES: Eyes grossly normal to inspection, PERRL and conjunctivae and sclerae normal  HENT: ear canals and TM's normal, nose and mouth without ulcers or lesions  NECK: no adenopathy, no asymmetry, masses, or scars and thyroid normal to palpation  RESP: lungs clear to auscultation - no rales, rhonchi or wheezes  BREAST: normal without masses, tenderness or nipple discharge and no palpable axillary masses or adenopathy  CV: regular rate and rhythm, normal S1 S2, no S3 or S4, no murmur, click or rub, no peripheral edema and peripheral pulses strong  ABDOMEN: soft, nontender, no hepatosplenomegaly, no masses and bowel sounds normal   (male): normal male genitalia without lesions or urethral discharge, no hernia  RECTAL: deferred  MS: no gross musculoskeletal defects noted, no edema  SKIN: no suspicious lesions or rashes  BACK: no CVA tenderness, no paralumbar tenderness  NEURO: Normal strength and tone, mentation intact and speech normal  PSYCH: mentation appears normal, affect normal/bright  LYMPH: no cervical, supraclavicular, axillary, or inguinal adenopathy    Recent Labs   Lab Test 05/24/19  0848 11/09/18  0912   CHOL 241* 259*   HDL 61 61   * 186*   TRIG 52 59     No results found for any visits on 12/13/19.    ASSESSMENT/PLAN:   John was seen today for physical.    Diagnoses and all orders for this visit:    Routine general medical examination at a health care facility  -  " "   CBC with platelets    Elevated blood pressure reading without diagnosis of hypertension - blood pressure not controlled today. He will continue to monitor diet and exercise to improve readings. Return in 1 month to recheck medication.   -     Albumin Random Urine Quantitative with Creat Ratio    Hyperlipidemia LDL goal <130 - last LDL uncontrolled. Labs pending today.   -     Lipid panel reflex to direct LDL Fasting    Screening for diabetes mellitus  -     Basic metabolic panel    Screening for deficiency anemia    Family history of BRCA2 gene positive    COUNSELING:  Reviewed preventive health counseling, as reflected in patient instructions    BP Readings from Last 1 Encounters:   12/13/19 (!) 136/90     Estimated body mass index is 29.27 kg/m  as calculated from the following:    Height as of this encounter: 1.778 m (5' 10\").    Weight as of this encounter: 92.5 kg (204 lb).    BP Screening:   Last 3 BP Readings:    BP Readings from Last 3 Encounters:   12/13/19 (!) 136/90   11/09/18 132/88   10/16/18 132/80     The following was recommended to the patient:  Re-screen BP within a year and recommended lifestyle modifications  Weight management plan: Discussed healthy diet and exercise guidelines     reports that he has never smoked. He has never used smokeless tobacco.    Counseling Resources:  ATP IV Guidelines  Pooled Cohorts Equation Calculator  FRAX Risk Assessment  ICSI Preventive Guidelines  Dietary Guidelines for Americans, 2010  USDA's MyPlate  ASA Prophylaxis  Lung CA Screening    The information in this document, created by the medical scribe for me, accurately reflects the services I personally performed and the decisions made by me. I have reviewed and approved this document for accuracy prior to leaving the patient care area.  12:11 PM, 12/13/19    Arcadio Regan MD  [facility] [place]    "

## 2019-12-14 LAB
CREAT UR-MCNC: 25 MG/DL
MICROALBUMIN UR-MCNC: <5 MG/L
MICROALBUMIN/CREAT UR: NORMAL MG/G CR (ref 0–17)

## 2019-12-16 NOTE — RESULT ENCOUNTER NOTE
Dear Eric,    Here is a summary of your recent test results:  -Normal red blood cell (hgb) levels, normal white blood cell count and normal platelet levels.  -LDL(bad) cholesterol level is elevated which can increase your heart disease risk.  A diet high in fat and simple carbohydrates, genetics and being overweight can contribute to this. ADVISE: exercising 150 minutes of aerobic exercise per week (30 minutes for 5 days per week or 50 minutes for 3 days per week are options) and eating a low saturated fat/low carbohydrate diet are helpful to improve this. In 6 months, you should recheck your fasting cholesterol panel by scheduling a lab-only appointment.  -Kidney function is normal (Cr, GFR), Sodium is normal, Potassium is normal, Calcium is normal, Glucose is normal.   -Microalbumin (urine protein) test is normal.  ADVISE: rechecking this annually.    For additional lab test information, [email or web address] is an excellent reference.           Thank you very much for trusting me and [facility].     Healthy regards,  Bowen Regan MD

## 2019-12-31 ENCOUNTER — MYC MEDICAL ADVICE (OUTPATIENT)
Dept: FAMILY MEDICINE | Facility: CLINIC | Age: 34
End: 2019-12-31

## 2019-12-31 NOTE — TELEPHONE ENCOUNTER
BP Readings from Last 3 Encounters:   12/13/19 (!) 136/90   11/09/18 132/88   10/16/18 132/80     My chart message sent     Yvette Dwyer RN, BSN  [facility]

## 2020-01-03 NOTE — PROGRESS NOTES
"Subjective   John Nicole is a 34 year old male who presents to clinic today for the following health issues:    HPI   Hypertension Follow-up      Do you check your blood pressure regularly outside of the clinic? Yes     Are you following a low salt diet? Yes    Are your blood pressures ever more than 140 on the top number (systolic) OR more   than 90 on the bottom number (diastolic), for example 140/90? Yes      How many servings of fruits and vegetables do you eat daily?  4 or more    On average, how many sweetened beverages do you drink each day (Examples: soda, juice, sweet tea, etc.  Do NOT count diet or artificially sweetened beverages)?   0    How many days per week do you miss taking your medication? N/A    Recently at home BP is 130-150 over 90 typically. Worried that it continues to be high and wants to start medication today.    Wondering when he should have lipids rechecked. Working on lifestyle changes.    Reviewed and updated as needed this visit by provider:  Tobacco  Allergies  Meds  Problems  Med Hx  Surg Hx  Fam Hx         Review of Systems   Constitutional, HEENT, cardiovascular, pulmonary, GI, , musculoskeletal, neuro, skin, endocrine and psych systems are negative, except as otherwise noted per HPI.      Objective   BP (!) 136/90   Pulse 110   Temp 98.3  F (36.8  C) (Oral)   Ht 1.778 m (5' 10\")   Wt 87.5 kg (193 lb)   SpO2 100%   BMI 27.69 kg/m   Body mass index is 27.69 kg/m .  Physical Exam   GENERAL: healthy, alert, well nourished, well hydrated, no distress  HENT: ear canals- normal; TMs- normal; Nose- normal; Mouth- no ulcers, no lesions  NECK: no tenderness, no adenopathy, no asymmetry, no masses, no stiffness; thyroid- normal to palpation  RESP: lungs clear to auscultation - no rales, no rhonchi, no wheezes  CV: regular rates and rhythm, normal S1 S2, no S3 or S4 and no murmur, no click or rub -          Assessment & Plan   John was seen today for " hypertension.    Diagnoses and all orders for this visit:    Essential hypertension  Start lisinopril, let us know if adverse effects. Discussed typical side effects and what to expect with BP. Return in 4 weeks for BP check.  -     lisinopril (PRINIVIL/ZESTRIL) 5 MG tablet; Take 1 tablet (5 mg) by mouth daily    Hyperlipidemia LDL goal <130  LDL elevated chronically, taking Cholest-off now. Has also lost weight. Recommend recheck in 2-3 months.                  See Patient Instructions    Return in about 1 month (around 2/4/2020) for Medication Managment Visit.            CHARLOTTE Julien     [address] [address]  [email or web address]   Office: [phone]

## 2020-01-04 ENCOUNTER — OFFICE VISIT (OUTPATIENT)
Dept: FAMILY MEDICINE | Facility: CLINIC | Age: 35
End: 2020-01-04
Payer: COMMERCIAL

## 2020-01-04 DIAGNOSIS — I10 ESSENTIAL HYPERTENSION: Primary | ICD-10-CM

## 2020-01-04 DIAGNOSIS — E78.5 HYPERLIPIDEMIA LDL GOAL <130: ICD-10-CM

## 2020-01-04 PROCEDURE — 99214 OFFICE O/P EST MOD 30 MIN: CPT | Performed by: NURSE PRACTITIONER

## 2020-01-04 RX ORDER — LISINOPRIL 5 MG/1
5 TABLET ORAL DAILY
Qty: 30 TABLET | Refills: 3 | Status: SHIPPED | OUTPATIENT
Start: 2020-01-04 | End: 2020-02-04

## 2020-01-04 ASSESSMENT — MIFFLIN-ST. JEOR: SCORE: 1811.69

## 2020-01-04 NOTE — PATIENT INSTRUCTIONS
Follow up in 3-4 weeks for BP recheck.     Recheck cholesterol with lifestyle changes in about 3 months.

## 2020-01-05 ENCOUNTER — MYC MEDICAL ADVICE (OUTPATIENT)
Dept: FAMILY MEDICINE | Facility: CLINIC | Age: 35
End: 2020-01-05

## 2020-01-06 ENCOUNTER — TELEPHONE (OUTPATIENT)
Dept: FAMILY MEDICINE | Facility: CLINIC | Age: 35
End: 2020-01-06

## 2020-01-06 VITALS
OXYGEN SATURATION: 100 % | TEMPERATURE: 98.3 F | DIASTOLIC BLOOD PRESSURE: 90 MMHG | HEIGHT: 70 IN | BODY MASS INDEX: 27.63 KG/M2 | SYSTOLIC BLOOD PRESSURE: 136 MMHG | HEART RATE: 110 BPM | WEIGHT: 193 LBS

## 2020-01-06 DIAGNOSIS — E78.5 HYPERLIPIDEMIA LDL GOAL <130: Primary | ICD-10-CM

## 2020-01-06 NOTE — TELEPHONE ENCOUNTER
Reason for Call:  Other call back    Detailed comments: pt called and stated that he need to send his record to [employer] by today but he would like to talk to CRISELDA Crowley before he send it      Phone Number Patient can be reached at: Cell number on file:    Telephone Information:   Mobile [phone]       Best Time: anytime     Can we leave a detailed message on this number? YES    Call taken on 1/6/2020 at 8:29 AM by PARAG MENON

## 2020-02-01 DIAGNOSIS — I10 ESSENTIAL HYPERTENSION: ICD-10-CM

## 2020-02-03 NOTE — TELEPHONE ENCOUNTER
"Requested Prescriptions   Pending Prescriptions Disp Refills     lisinopril (PRINIVIL/ZESTRIL) 5 MG tablet [Pharmacy Med Name: LISINOPRIL 5MG TABLETS] 90 tablet      Sig: TAKE 1 TABLET BY MOUTH EVERY DAY       Last Written Prescription Date:  1/4/2020  Last Fill Quantity: 30,  # refills: 3   Last office visit: 1/4/2020 with prescribing provider:     Future Office Visit:   Next 5 appointments (look out 90 days)    Feb 07, 2020  8:00 AM CST  Office Visit with Arcadio Regan MD  [facility] ([facility]) [address] [place] [id number] [phone]               ACE Inhibitors (Including Combos) Protocol Failed - 2/1/2020 11:13 AM        Failed - Blood pressure under 140/90 in past 12 months     BP Readings from Last 3 Encounters:   01/04/20 (!) 136/90   12/13/19 (!) 136/90   11/09/18 132/88                 Passed - Recent (12 mo) or future (30 days) visit within the authorizing provider's specialty     Patient has had an office visit with the authorizing provider or a provider within the authorizing providers department within the previous 12 mos or has a future within next 30 days. See \"Patient Info\" tab in inbasket, or \"Choose Columns\" in Meds & Orders section of the refill encounter.              Passed - Medication is active on med list        Passed - Patient is age 18 or older        Passed - Normal serum creatinine on file in past 12 months     Recent Labs   Lab Test 12/13/19  1203   CR 0.94             Passed - Normal serum potassium on file in past 12 months     Recent Labs   Lab Test 12/13/19  1203   POTASSIUM 3.9             "

## 2020-02-04 RX ORDER — LISINOPRIL 5 MG/1
TABLET ORAL
Qty: 90 TABLET | Refills: 0 | Status: SHIPPED | OUTPATIENT
Start: 2020-02-04 | End: 2020-03-13

## 2020-02-06 NOTE — PROGRESS NOTES
"Subjective   John Nicole is a 34 year old male who presents to clinic today for the following health issues:    HPI   Hypertension Follow-up    Do you check your blood pressure regularly outside of the clinic? Yes     Are you following a low salt diet? Yes    Are your blood pressures ever more than 140 on the top number (systolic) OR more   than 90 on the bottom number (diastolic), for example 140/90? No- 120's-130's/70's-80's    Kidney Stones  3 mm stone found on previous images.     Reviewed and updated as needed this visit by provider:  Tobacco  Allergies  Meds  Problems  Med Hx  Surg Hx  Fam Hx         Review of Systems   Constitutional, HEENT, cardiovascular, pulmonary, GI, , musculoskeletal, neuro, skin, endocrine and psych systems are negative, except as otherwise noted.    This document serves as a record of the services and decisions personally performed and made by Arcadio Regan MD. It was created on his behalf by Sean Mitchell, a trained medical scribe. The creation of this document is based the provider's statements to the medical scribe.  Scribe Sean Mitchell 8:24 AM, February 7, 2020        Objective   /77   Pulse 88   Temp 98.3  F (36.8  C) (Oral)   Ht 1.778 m (5' 10\")   Wt 86.6 kg (191 lb)   SpO2 100%   BMI 27.41 kg/m   Body mass index is 27.41 kg/m .  Physical Exam   GENERAL: healthy, alert, well nourished, well hydrated, no distress  EYES: Eyes grossly normal to inspection, extraocular movements - intact, and PERRL  HENT: ear canals- normal; TMs- normal; Nose- normal; Mouth- no ulcers, no lesions  NECK: no tenderness, no adenopathy, no asymmetry, no masses, no stiffness; thyroid- normal to palpation  RESP: lungs clear to auscultation - no rales, no rhonchi, no wheezes  CV: regular rates and rhythm, normal S1 S2, no S3 or S4 and no murmur, no click or rub -  ABDOMEN: soft, no tenderness, no  hepatosplenomegaly, no masses, normal bowel sounds  MS: extremities- no gross " deformities noted, no edema  NEURO: strength and tone- normal, sensory exam- grossly normal, mentation- intact, speech- normal, reflexes- symmetric  PSYCH: Alert and oriented times 3; speech- coherent , normal rate and volume; able to articulate logical thoughts, able to abstract reason, no tangential thoughts, no hallucinations or delusions, affect- normal  LYMPHATICS: ant. cervical- normal, post. cervical- normal, axillary- normal, supraclavicular- normal, inguinal- normal        Assessment & Plan   John was seen today for recheck medication.    Diagnoses and all orders for this visit:    Hypertension goal BP (blood pressure) < 130/80 - blood pressure controlled today.  Letter for [employer] and [employer]C will be done Future labs ordered.   -     Lipid panel reflex to direct LDL Fasting; Future  -     **Basic metabolic panel FUTURE 2mo; Future    See Patient Instructions    Return in about 1 month (around 3/7/2020).    The information in this document, created by the medical scribe for me, accurately reflects the services I personally performed and the decisions made by me. I have reviewed and approved this document for accuracy prior to leaving the patient care area.  8:54 AM, 02/07/20        Bowen Regan MD      [address] [address]  [email or web address]   Office: [phone]  Pager: [phone]

## 2020-02-07 ENCOUNTER — OFFICE VISIT (OUTPATIENT)
Dept: FAMILY MEDICINE | Facility: CLINIC | Age: 35
End: 2020-02-07
Payer: COMMERCIAL

## 2020-02-07 VITALS
DIASTOLIC BLOOD PRESSURE: 77 MMHG | SYSTOLIC BLOOD PRESSURE: 119 MMHG | OXYGEN SATURATION: 100 % | WEIGHT: 191 LBS | BODY MASS INDEX: 27.35 KG/M2 | TEMPERATURE: 98.3 F | HEART RATE: 88 BPM | HEIGHT: 70 IN

## 2020-02-07 DIAGNOSIS — I10 HYPERTENSION GOAL BP (BLOOD PRESSURE) < 130/80: Primary | ICD-10-CM

## 2020-02-07 PROCEDURE — 99214 OFFICE O/P EST MOD 30 MIN: CPT | Performed by: FAMILY MEDICINE

## 2020-02-07 ASSESSMENT — MIFFLIN-ST. JEOR: SCORE: 1802.62

## 2020-02-07 NOTE — LETTER
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    Regarding his hypertension, his blood pressure is well controlled on Lisinopril 5 mg daily without side effects since 1/5/2020.  He does not have any other cardiovascular, cerebrovascular, or arteriosclerotic disease.  He has a a very good prognosis.    BP Readings from Last 3 Encounters:   02/07/20 119/77   01/04/20  136/90   12/13/19  136/90       Please contact me if you need further information or have further questions.    Sincerely,        Bowen Regan M.D.

## 2020-03-13 ENCOUNTER — OFFICE VISIT (OUTPATIENT)
Dept: FAMILY MEDICINE | Facility: CLINIC | Age: 35
End: 2020-03-13
Payer: COMMERCIAL

## 2020-03-13 VITALS
SYSTOLIC BLOOD PRESSURE: 124 MMHG | DIASTOLIC BLOOD PRESSURE: 84 MMHG | OXYGEN SATURATION: 100 % | WEIGHT: 188 LBS | HEIGHT: 70 IN | HEART RATE: 96 BPM | BODY MASS INDEX: 26.92 KG/M2 | TEMPERATURE: 97.8 F

## 2020-03-13 DIAGNOSIS — E78.5 HYPERLIPIDEMIA LDL GOAL <100: ICD-10-CM

## 2020-03-13 DIAGNOSIS — N20.0 NEPHROLITHIASIS: ICD-10-CM

## 2020-03-13 DIAGNOSIS — I10 HYPERTENSION GOAL BP (BLOOD PRESSURE) < 130/80: Primary | ICD-10-CM

## 2020-03-13 LAB
ALBUMIN SERPL-MCNC: 4.1 G/DL (ref 3.4–5)
ALP SERPL-CCNC: 62 U/L (ref 40–150)
ALT SERPL W P-5'-P-CCNC: 29 U/L (ref 0–70)
ANION GAP SERPL CALCULATED.3IONS-SCNC: 5 MMOL/L (ref 3–14)
AST SERPL W P-5'-P-CCNC: 16 U/L (ref 0–45)
BILIRUB SERPL-MCNC: 0.7 MG/DL (ref 0.2–1.3)
BUN SERPL-MCNC: 12 MG/DL (ref 7–30)
CALCIUM SERPL-MCNC: 9.3 MG/DL (ref 8.5–10.1)
CHLORIDE SERPL-SCNC: 103 MMOL/L (ref 94–109)
CHOLEST SERPL-MCNC: 262 MG/DL
CO2 SERPL-SCNC: 27 MMOL/L (ref 20–32)
CREAT SERPL-MCNC: 0.94 MG/DL (ref 0.66–1.25)
GFR SERPL CREATININE-BSD FRML MDRD: >90 ML/MIN/{1.73_M2}
GLUCOSE SERPL-MCNC: 113 MG/DL (ref 70–99)
HDLC SERPL-MCNC: 70 MG/DL
LDLC SERPL CALC-MCNC: 184 MG/DL
NONHDLC SERPL-MCNC: 192 MG/DL
POTASSIUM SERPL-SCNC: 4.2 MMOL/L (ref 3.4–5.3)
PROT SERPL-MCNC: 7.9 G/DL (ref 6.8–8.8)
SODIUM SERPL-SCNC: 135 MMOL/L (ref 133–144)
TRIGL SERPL-MCNC: 38 MG/DL

## 2020-03-13 PROCEDURE — 80061 LIPID PANEL: CPT | Performed by: FAMILY MEDICINE

## 2020-03-13 PROCEDURE — 36415 COLL VENOUS BLD VENIPUNCTURE: CPT | Performed by: FAMILY MEDICINE

## 2020-03-13 PROCEDURE — 99213 OFFICE O/P EST LOW 20 MIN: CPT | Performed by: FAMILY MEDICINE

## 2020-03-13 PROCEDURE — 80053 COMPREHEN METABOLIC PANEL: CPT | Performed by: FAMILY MEDICINE

## 2020-03-13 RX ORDER — LISINOPRIL 5 MG/1
5 TABLET ORAL DAILY
Qty: 90 TABLET | Refills: 3 | Status: SHIPPED | OUTPATIENT
Start: 2020-03-13 | End: 2021-04-15

## 2020-03-13 ASSESSMENT — MIFFLIN-ST. JEOR: SCORE: 1789.01

## 2020-03-13 NOTE — LETTER
[address] [address]                                                                                                       [phone]    03/13/2020    John Nicole  [address] [place] [address]      To Whom it May Concern:    The above patient was diagnosed with left sided kidney stone/renal colic on 10/10/2018. His CT scan showed a 3 mm stone at the ureterovesicular junction.  He was given one dose of IM Toradol in the ED and his symptoms resolved and he has had no further renal colic.  He started tamsulosin 0.4 mg daily which is well tolerated without side effects.  He was given a percocet 5/325 mg prescription but never filled it as he has been pain-free since leaving the hospital. He believes  the stone passed while in the hospital.      He was seen 10/16/2018 for followup and continued to not have pain.  His urinalysis was normal and KUB xray does not show any signs of a retained stone.    Today, he continues to be symptom free without any hematuria or pain.  Further imagine is not indicated.    He is okay to return to full duty without restrictions.  Please contact me with questions or concerns.      Sincerely,                  Bowen Regan M.D.

## 2020-03-13 NOTE — LETTER
[facility] - [address] [address]                                                                                                       [phone]    03/13/2020    John Nicole  [address]  [place] [address]      To Whom it May Concern:    The above patient was diagnosed with left sided kidney stone/renal colic on 10/10/2018. His CT scan showed a 3 mm stone at the ureterovesicular junction.  He was given one dose of IM Toradol in the ED and his symptoms resolved and he has had no further renal colic.  He started tamsulosin 0.4 mg daily which wass well tolerated without side effects.  He was given a percocet 5/325 mg prescription but never filled it as he has been pain-free since leaving the hospital. He believes the stone passed while in the hospital.      He was seen 10/16/2018 for followup and continued to not have pain.  His urinalysis was normal and KUB xray does not show any signs of a retained stone.    Today, he continues to be symptom free without any hematuria or pain.  Further imaging is not indicated.    He is okay to return to full duty without restrictions.  Please contact me with questions or concerns.      Sincerely,                  Bowen Regan M.D.

## 2020-03-13 NOTE — PROGRESS NOTES
"Subjective   John Nicole is a 34 year old male who presents to clinic today for the following health issues:    HPI   Hypertension Follow-up    Do you check your blood pressure regularly outside of the clinic? Only 1 time since last time 127/79 average     Are you following a low salt diet? Yes    Are your blood pressures ever more than 140 on the top number (systolic) OR more   than 90 on the bottom number (diastolic), for example 140/90? No  No side effects.     Nephrolithiasis   History but no recent episodes.     Reviewed and updated as needed this visit by provider:  Tobacco  Allergies  Meds  Problems  Med Hx  Surg Hx  Fam Hx         Review of Systems   Constitutional, HEENT, cardiovascular, pulmonary, GI, , musculoskeletal, neuro, skin, endocrine and psych systems are negative, except as otherwise noted.    This document serves as a record of the services and decisions personally performed and made by Arcadio Regan MD. It was created on his behalf by Sean Mitchell, a trained medical scribe. The creation of this document is based the provider's statements to the medical scribe.  Scribe Sean Mitchell 9:30 AM, March 13, 2020        Objective   /84   Pulse 96   Temp 97.8  F (36.6  C) (Oral)   Ht 1.778 m (5' 10\")   Wt 85.3 kg (188 lb)   SpO2 100%   BMI 26.98 kg/m   Body mass index is 26.98 kg/m .  Physical Exam   GENERAL: healthy, alert, well nourished, well hydrated, no distress  EYES: Eyes grossly normal to inspection, extraocular movements - intact, and PERRL  HENT: ear canals- normal; TMs- normal; Nose- normal; Mouth- no ulcers, no lesions  NECK: no tenderness, no adenopathy, no asymmetry, no masses, no stiffness; thyroid- normal to palpation  RESP: lungs clear to auscultation - no rales, no rhonchi, no wheezes  CV: regular rates and rhythm, normal S1 S2, no S3 or S4 and no murmur, no click or rub -  ABDOMEN: soft, no tenderness, no  hepatosplenomegaly, no masses, normal bowel sounds  MS: " extremities- no gross deformities noted, no edema  NEURO: strength and tone- normal, sensory exam- grossly normal, mentation- intact, speech- normal, reflexes- symmetric  PSYCH: Alert and oriented times 3; speech- coherent , normal rate and volume; able to articulate logical thoughts, able to abstract reason, no tangential thoughts, no hallucinations or delusions, affect- normal  LYMPHATICS: ant. cervical- normal, post. cervical- normal, axillary- normal, supraclavicular- normal, inguinal- normal        Assessment & Plan   John was seen today for recheck medication.    Diagnoses and all orders for this visit:    Hypertension goal BP (blood pressure) < 130/80 - Blood pressure fair control today. Labs pending today. Forms filled out for FAA.  -     Comprehensive metabolic panel (BMP + Alb, Alk Phos, ALT, AST, Total. Bili, TP)  -     Lipid panel reflex to direct LDL Fasting  -     lisinopril (ZESTRIL) 5 MG tablet; Take 1 tablet (5 mg) by mouth daily    Hyperlipidemia LDL goal <100 - Last LDL elevated. Labs pending today.   -     Lipid panel reflex to direct LDL Fasting    History of Nephrolithiasis - no recent symptoms or episodes.     See Patient Instructions     Return in about 2 weeks (around 3/27/2020), or if symptoms worsen or fail to improve, for Blood Pressure Recheck.    The information in this document, created by the medical scribe for me, accurately reflects the services I personally performed and the decisions made by me. I have reviewed and approved this document for accuracy prior to leaving the patient care area.  9:48 AM, 03/13/20        Bowen Regan MD      [address] [address]  [email or web address]   Office: [phone]  Pager: [phone]

## 2020-03-19 RX ORDER — SIMVASTATIN 20 MG
20 TABLET ORAL AT BEDTIME
Qty: 90 TABLET | Refills: 3 | Status: SHIPPED | OUTPATIENT
Start: 2020-03-19 | End: 2020-11-10 | Stop reason: SINTOL

## 2020-03-19 NOTE — RESULT ENCOUNTER NOTE
Dear Eric,    Here is a summary of your recent test results:  -LDL(bad) cholesterol level is very elevated still which can increase your heart disease risk.  A diet high in fat and simple carbohydrates, genetics and being overweight can contribute to this. ADVISE: exercising 150 minutes of aerobic exercise per week (30 minutes for 5 days per week or 50 minutes for 3 days per week are options) and eating a low saturated fat/low carbohydrate diet are helpful to improve this.     Current guidelines from the American Heart Association support starting a cholesterol lowering medication to lower your heart and stroke disease risk.  I am sending a prescription to your pharmacy: simvastatin 20 mg each evening.  You can call your pharmacy to let them know you would like your prescription filled and if you have concerns about this recommendation then please contact me. In 3 months, you should recheck your fasting cholesterol panel by scheduling a lab-only appointment.    -Liver and gallbladder tests (ALT,AST, Alk phos,bilirubin) are normal.  -Kidney function (GFR) is normal.  -Sodium is normal.  -Potassium is normal.  -Calcium is normal.  -Glucose is slight elevated and may be a sign of early diabetes (prediabetes). ADVISE:: eating a low carbohydrate diet, exercising, trying to lose weight (if necessary) and rechecking your glucose level in 12 months.    For additional lab test information, labtestsonline.org is an excellent reference.           Thank you very much for trusting me and [facility].     Healthy regards,  Bowen Regan MD

## 2020-03-20 ENCOUNTER — ALLIED HEALTH/NURSE VISIT (OUTPATIENT)
Dept: NURSING | Facility: CLINIC | Age: 35
End: 2020-03-20
Payer: COMMERCIAL

## 2020-03-20 VITALS — SYSTOLIC BLOOD PRESSURE: 136 MMHG | DIASTOLIC BLOOD PRESSURE: 86 MMHG

## 2020-03-20 DIAGNOSIS — I10 HYPERTENSION GOAL BP (BLOOD PRESSURE) < 130/80: Primary | ICD-10-CM

## 2020-03-20 NOTE — PROGRESS NOTES
Form completed for patient as well for BP for [employer]  Already signed by MD CECILIO    Original given to patient, copy sent to scan.     Carissa Castro RN  [facility]

## 2020-03-20 NOTE — PROGRESS NOTES
John Nicole is a 34 year old year old patient who comes in today for a Blood Pressure check because of ongoing blood pressure monitoring and for work.    Vital Signs as repeated by /86    Patient is taking medication as prescribed  Patient is tolerating medications well.  Patient is monitoring Blood Pressure at home.  Average readings if yes are 130's/80's  Current complaints: none    Disposition:  patient to continue with the same medication and patient reminded to call as needed      BP Readings from Last 6 Encounters:   03/20/20 136/86   03/13/20 124/84   02/07/20 119/77   01/04/20 (!) 136/90   12/13/19 (!) 136/90   11/09/18 132/88         KYLAH Kiser, RN, PHN  [facility]  Office: [phone]  Fax: [phone]

## 2020-08-11 ENCOUNTER — MYC MEDICAL ADVICE (OUTPATIENT)
Dept: FAMILY MEDICINE | Facility: CLINIC | Age: 35
End: 2020-08-11
Payer: COMMERCIAL

## 2020-08-11 NOTE — LETTER
[address] [address] [phone]       August 12, 2020    RE:  John Nicole  [address] [place] [address]    To Whom it May Concern:    John Nicole has hypertension which can increase his risk of complications if infected with COVID-19.  Due to this increased risk it is recommended that his wife, Brigette Nicole, be allowed teach from home full-time reduce the risk of COVID-19 being brought into their home. Please contact me with questions or concerns.      Sincerely,          Bowen Regan M.D.

## 2020-08-11 NOTE — TELEPHONE ENCOUNTER
Please see my chart message below     Please review and advise     Thank you     Yvette Dwyer RN, BSN  [facility]

## 2020-11-07 ENCOUNTER — MYC MEDICAL ADVICE (OUTPATIENT)
Dept: FAMILY MEDICINE | Facility: CLINIC | Age: 35
End: 2020-11-07

## 2020-11-09 NOTE — TELEPHONE ENCOUNTER
Please see my chart message below     Please review and advise     Thank you     Yvette Dwyer RN, BSN  [facility]

## 2020-12-27 ENCOUNTER — HEALTH MAINTENANCE LETTER (OUTPATIENT)
Age: 35
End: 2020-12-27

## 2021-01-03 ENCOUNTER — MYC MEDICAL ADVICE (OUTPATIENT)
Dept: FAMILY MEDICINE | Facility: CLINIC | Age: 36
End: 2021-01-03

## 2021-01-04 NOTE — TELEPHONE ENCOUNTER
Imm Hx updated  Immunization History   Administered Date(s) Administered     Influenza Vaccine IM > 6 months Valent IIV4 10/16/2018, 10/14/2019, 10/01/2020     TD (ADULT, 7+) 11/09/2018     TDAP Vaccine (Boostrix) 11/01/2008     MyChart sent      Carissa Castro RN  [facility]

## 2021-01-15 ENCOUNTER — HEALTH MAINTENANCE LETTER (OUTPATIENT)
Age: 36
End: 2021-01-15

## 2021-03-11 ENCOUNTER — IMMUNIZATION (OUTPATIENT)
Dept: NURSING | Facility: CLINIC | Age: 36
End: 2021-03-11
Payer: COMMERCIAL

## 2021-03-11 PROCEDURE — 91300 PR COVID VAC PFIZER DIL RECON 30 MCG/0.3 ML IM: CPT

## 2021-03-11 PROCEDURE — 0001A PR COVID VAC PFIZER DIL RECON 30 MCG/0.3 ML IM: CPT

## 2021-04-01 ENCOUNTER — IMMUNIZATION (OUTPATIENT)
Dept: NURSING | Facility: CLINIC | Age: 36
End: 2021-04-01
Attending: INTERNAL MEDICINE
Payer: COMMERCIAL

## 2021-04-01 PROCEDURE — 91300 PR COVID VAC PFIZER DIL RECON 30 MCG/0.3 ML IM: CPT

## 2021-04-01 PROCEDURE — 0002A PR COVID VAC PFIZER DIL RECON 30 MCG/0.3 ML IM: CPT

## 2021-04-13 DIAGNOSIS — I10 HYPERTENSION GOAL BP (BLOOD PRESSURE) < 130/80: ICD-10-CM

## 2021-04-14 NOTE — TELEPHONE ENCOUNTER
LOV: 3/13/2020  Patient due for physical  No future appt scheduled    Routing to [facility] to assist in scheduling      Carissa Castro RN  [facility]

## 2021-04-15 ENCOUNTER — OFFICE VISIT (OUTPATIENT)
Dept: FAMILY MEDICINE | Facility: CLINIC | Age: 36
End: 2021-04-15
Payer: COMMERCIAL

## 2021-04-15 ENCOUNTER — MYC MEDICAL ADVICE (OUTPATIENT)
Dept: FAMILY MEDICINE | Facility: CLINIC | Age: 36
End: 2021-04-15

## 2021-04-15 VITALS
HEART RATE: 95 BPM | SYSTOLIC BLOOD PRESSURE: 136 MMHG | WEIGHT: 195 LBS | HEIGHT: 70 IN | BODY MASS INDEX: 27.92 KG/M2 | TEMPERATURE: 97.7 F | DIASTOLIC BLOOD PRESSURE: 92 MMHG | RESPIRATION RATE: 20 BRPM | OXYGEN SATURATION: 99 %

## 2021-04-15 DIAGNOSIS — Z13.0 SCREENING FOR IRON DEFICIENCY ANEMIA: ICD-10-CM

## 2021-04-15 DIAGNOSIS — E78.5 HYPERLIPIDEMIA LDL GOAL <100: ICD-10-CM

## 2021-04-15 DIAGNOSIS — I10 HYPERTENSION GOAL BP (BLOOD PRESSURE) < 130/80: ICD-10-CM

## 2021-04-15 DIAGNOSIS — Z00.00 ROUTINE GENERAL MEDICAL EXAMINATION AT A HEALTH CARE FACILITY: Primary | ICD-10-CM

## 2021-04-15 DIAGNOSIS — Z13.1 SCREENING FOR DIABETES MELLITUS: ICD-10-CM

## 2021-04-15 PROCEDURE — 82043 UR ALBUMIN QUANTITATIVE: CPT | Performed by: FAMILY MEDICINE

## 2021-04-15 PROCEDURE — 99395 PREV VISIT EST AGE 18-39: CPT | Performed by: FAMILY MEDICINE

## 2021-04-15 RX ORDER — LISINOPRIL 5 MG/1
5 TABLET ORAL DAILY
Qty: 90 TABLET | Refills: 3 | Status: SHIPPED | OUTPATIENT
Start: 2021-04-15 | End: 2022-01-11

## 2021-04-15 SDOH — ECONOMIC STABILITY: TRANSPORTATION INSECURITY
IN THE PAST 12 MONTHS, HAS THE LACK OF TRANSPORTATION KEPT YOU FROM MEDICAL APPOINTMENTS OR FROM GETTING MEDICATIONS?: NOT ASKED

## 2021-04-15 SDOH — ECONOMIC STABILITY: TRANSPORTATION INSECURITY
IN THE PAST 12 MONTHS, HAS LACK OF TRANSPORTATION KEPT YOU FROM MEETINGS, WORK, OR FROM GETTING THINGS NEEDED FOR DAILY LIVING?: NOT ASKED

## 2021-04-15 SDOH — ECONOMIC STABILITY: FOOD INSECURITY: WITHIN THE PAST 12 MONTHS, YOU WORRIED THAT YOUR FOOD WOULD RUN OUT BEFORE YOU GOT MONEY TO BUY MORE.: NOT ASKED

## 2021-04-15 SDOH — ECONOMIC STABILITY: FOOD INSECURITY: WITHIN THE PAST 12 MONTHS, THE FOOD YOU BOUGHT JUST DIDN'T LAST AND YOU DIDN'T HAVE MONEY TO GET MORE.: NOT ASKED

## 2021-04-15 SDOH — ECONOMIC STABILITY: INCOME INSECURITY: HOW HARD IS IT FOR YOU TO PAY FOR THE VERY BASICS LIKE FOOD, HOUSING, MEDICAL CARE, AND HEATING?: NOT ASKED

## 2021-04-15 ASSESSMENT — PAIN SCALES - GENERAL: PAINLEVEL: NO PAIN (0)

## 2021-04-15 ASSESSMENT — MIFFLIN-ST. JEOR: SCORE: 1815.76

## 2021-04-15 NOTE — PROGRESS NOTES
SUBJECTIVE:   CC: John Nicole is an 35 year old male who presents for preventive health visit.     Patient has been advised of split billing requirements and indicates understanding: Yes  Healthy Habits:     Getting at least 3 servings of Calcium per day:  Yes    Bi-annual eye exam:  Yes    Dental care twice a year:  Yes    Sleep apnea or symptoms of sleep apnea:  None    Diet:  Low salt and Low fat/cholesterol    Frequency of exercise:  4-5 days/week    Duration of exercise:  Greater than 60 minutes    Taking medications regularly:  Yes    PHQ-2 Total Score: 0    Additional concerns today:  No      Hypertension Follow-up      Do you check your blood pressure regularly outside of the clinic? Yes     Are you following a low salt diet? Yes    Are your blood pressures ever more than 140 on the top number (systolic) OR more   than 90 on the bottom number (diastolic), for example 140/90? No    Today's PHQ-2 Score:   PHQ-2 ( 1999 Pfizer) 4/15/2021 3/13/2020   Q1: Little interest or pleasure in doing things 0 0   Q2: Feeling down, depressed or hopeless 0 0   PHQ-2 Score 0 0   Q1: Little interest or pleasure in doing things Not at all -   Q2: Feeling down, depressed or hopeless Not at all -   PHQ-2 Score 0 -     Lipids - tried statin x 2 and felt forgetful.    Abuse: Current or Past(Physical, Sexual or Emotional)- No  Do you feel safe in your environment? Yes    Have you ever done Advance Care Planning? (For example, a Health Directive, POLST, or a discussion with a medical provider or your loved ones about your wishes): No, advance care planning information given to patient to review.  Advanced care planning was discussed at today's visit.    Social History     Tobacco Use     Smoking status: Never Smoker     Smokeless tobacco: Never Used   Substance Use Topics     Alcohol use: Yes     Comment: once a month 1 beer     If you drink alcohol do you typically have >3 drinks per day or >7 drinks per week? No    Reviewed orders  "with patient. Reviewed health maintenance and updated orders accordingly - Yes  Reviewed and updated as needed this visit by clinical staff  Tobacco  Allergies  Meds  Problems  Med Hx  Surg Hx  Fam Hx  Soc Hx          Reviewed and updated as needed this visit by Provider  Tobacco  Allergies  Meds  Problems  Med Hx  Surg Hx  Fam Hx           ROS:  Constitutional, HEENT, cardiovascular, pulmonary, GI, , musculoskeletal, neuro, skin, endocrine and psych systems are negative, except as otherwise noted.  OBJECTIVE:   BP (!) 136/92   Pulse 95   Temp 97.7  F (36.5  C) (Tympanic)   Resp 20   Ht 1.778 m (5' 10\")   Wt 88.5 kg (195 lb)   SpO2 99%   BMI 27.98 kg/m    EXAM:  GENERAL: healthy, alert and no distress  EYES: Eyes grossly normal to inspection, PERRL and conjunctivae and sclerae normal  HENT: ear canals and TM's normal, nose and mouth without ulcers or lesions  NECK: no adenopathy, no asymmetry, masses, or scars and thyroid normal to palpation  RESP: lungs clear to auscultation - no rales, rhonchi or wheezes  BREAST: normal without masses, tenderness or nipple discharge and no palpable axillary masses or adenopathy  CV: regular rate and rhythm, normal S1 S2, no S3 or S4, no murmur, click or rub, no peripheral edema and peripheral pulses strong  ABDOMEN: soft, nontender, no hepatosplenomegaly, no masses and bowel sounds normal   (male): normal male genitalia without lesions or urethral discharge, no hernia  MS: no gross musculoskeletal defects noted, no edema  SKIN: no suspicious lesions or rashes  NEURO: Normal strength and tone, mentation intact and speech normal  PSYCH: mentation appears normal, affect normal/bright  LYMPH: no cervical, supraclavicular, axillary, or inguinal adenopathy    ASSESSMENT/PLAN:   Routine general medical examination at a health care facility    Hypertension goal BP (blood pressure) < 130/80  Controlled - continue medication.   - COMPREHENSIVE METABOLIC PANEL  - " "Albumin Random Urine Quantitative with Creat Ratio  - lisinopril (ZESTRIL) 5 MG tablet  Dispense: 90 tablet; Refill: 3    Hyperlipidemia LDL goal <100  Elevated previously and did not tolerate statins very well.  - Lipid panel reflex to direct LDL Fasting    Screening for iron deficiency anemia    - CBC with platelets    Screening for diabetes mellitus    - COMPREHENSIVE METABOLIC PANEL      COUNSELING:  Reviewed preventive health counseling, as reflected in patient instructions    Estimated body mass index is 27.98 kg/m  as calculated from the following:    Height as of this encounter: 1.778 m (5' 10\").    Weight as of this encounter: 88.5 kg (195 lb).  Weight management plan: Discussed healthy diet and exercise guidelines     reports that he has never smoked. He has never used smokeless tobacco.      Return in about 1 week (around 4/22/2021) for lab only appointment, in person.           Bowen Regan MD     [address] [address]  [email or web address]     Office: [phone]     "

## 2021-04-16 LAB
CREAT UR-MCNC: 11 MG/DL
MICROALBUMIN UR-MCNC: <5 MG/L
MICROALBUMIN/CREAT UR: NORMAL MG/G CR (ref 0–17)

## 2021-04-17 NOTE — RESULT ENCOUNTER NOTE
Dear Eric,    Here is a summary of your recent test results:  -Microalbumin (urine protein) test is normal.  ADVISE: rechecking this annually.    For additional lab test information, [email or web address] is an excellent reference.    In addition, here is a list of due or overdue Health Maintenance reminders:  Comprehensive Metabolic Panel due on 03/13/2021  Cholesterol Lab due on 03/13/2021    Please call us at [phone] (or use [employer]) to address the above recommendations if needed.           Thank you very much for trusting me and [facility].     Have a peaceful day.    Healthy regards,  Bowen Regan MD

## 2021-04-20 RX ORDER — LISINOPRIL 5 MG/1
TABLET ORAL
Qty: 90 TABLET | Refills: 3 | OUTPATIENT
Start: 2021-04-20

## 2021-04-25 ENCOUNTER — MYC MEDICAL ADVICE (OUTPATIENT)
Dept: FAMILY MEDICINE | Facility: CLINIC | Age: 36
End: 2021-04-25

## 2021-04-25 NOTE — LETTER
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    Regarding his hypertension, his blood pressure is <150/95 and takes Lisinopril 5 mg  daily without side effects.  He does not have any other cardiovascular, cerebrovascular, or arteriosclerotic disease.    Rechecking his renal calculi history, He had a single stone in 10/708913 that passed and he did not have any complications, including the following:    Hydronephrosis (chronic).    Metabolic/underlying condition requiring treatment/surveillance/monitoring    Procedures (3 or more for kidney stones within the last 5 years)    Renal failure or obstruction (acute or chronic).    Sepsis or recurrent urinary tract infections  He has no signs of residual stones (confirmed on 5/23/2019 CT scan of the abd/pelvis).     Please contact me if you need further information or have further questions.    Sincerely,      Bowen Regan MD

## 2021-04-26 NOTE — TELEPHONE ENCOUNTER
Please see my chart message below     Please review and advise     Thank you     Yvette Dwyer RN, BSN  [place] [facility]

## 2021-05-03 DIAGNOSIS — E78.5 HYPERLIPIDEMIA LDL GOAL <100: ICD-10-CM

## 2021-05-03 DIAGNOSIS — Z13.0 SCREENING FOR IRON DEFICIENCY ANEMIA: ICD-10-CM

## 2021-05-03 DIAGNOSIS — Z13.1 SCREENING FOR DIABETES MELLITUS: ICD-10-CM

## 2021-05-03 DIAGNOSIS — I10 HYPERTENSION GOAL BP (BLOOD PRESSURE) < 130/80: ICD-10-CM

## 2021-05-03 LAB
ALBUMIN SERPL-MCNC: 4.1 G/DL (ref 3.4–5)
ALP SERPL-CCNC: 54 U/L (ref 40–150)
ALT SERPL W P-5'-P-CCNC: 38 U/L (ref 0–70)
ANION GAP SERPL CALCULATED.3IONS-SCNC: 3 MMOL/L (ref 3–14)
AST SERPL W P-5'-P-CCNC: 22 U/L (ref 0–45)
BILIRUB SERPL-MCNC: 0.7 MG/DL (ref 0.2–1.3)
BUN SERPL-MCNC: 13 MG/DL (ref 7–30)
CALCIUM SERPL-MCNC: 9.4 MG/DL (ref 8.5–10.1)
CHLORIDE SERPL-SCNC: 104 MMOL/L (ref 94–109)
CHOLEST SERPL-MCNC: 267 MG/DL
CO2 SERPL-SCNC: 30 MMOL/L (ref 20–32)
CREAT SERPL-MCNC: 0.98 MG/DL (ref 0.66–1.25)
ERYTHROCYTE [DISTWIDTH] IN BLOOD BY AUTOMATED COUNT: 11.7 % (ref 10–15)
GFR SERPL CREATININE-BSD FRML MDRD: >90 ML/MIN/{1.73_M2}
GLUCOSE SERPL-MCNC: 102 MG/DL (ref 70–99)
HCT VFR BLD AUTO: 45 % (ref 40–53)
HDLC SERPL-MCNC: 69 MG/DL
HGB BLD-MCNC: 15.5 G/DL (ref 13.3–17.7)
LDLC SERPL CALC-MCNC: 189 MG/DL
MCH RBC QN AUTO: 30.8 PG (ref 26.5–33)
MCHC RBC AUTO-ENTMCNC: 34.4 G/DL (ref 31.5–36.5)
MCV RBC AUTO: 90 FL (ref 78–100)
NONHDLC SERPL-MCNC: 198 MG/DL
PLATELET # BLD AUTO: 237 10E9/L (ref 150–450)
POTASSIUM SERPL-SCNC: 4.4 MMOL/L (ref 3.4–5.3)
PROT SERPL-MCNC: 7.5 G/DL (ref 6.8–8.8)
RBC # BLD AUTO: 5.03 10E12/L (ref 4.4–5.9)
SODIUM SERPL-SCNC: 137 MMOL/L (ref 133–144)
TRIGL SERPL-MCNC: 43 MG/DL
WBC # BLD AUTO: 6.2 10E9/L (ref 4–11)

## 2021-05-03 PROCEDURE — 80053 COMPREHEN METABOLIC PANEL: CPT | Performed by: FAMILY MEDICINE

## 2021-05-03 PROCEDURE — 80061 LIPID PANEL: CPT | Performed by: FAMILY MEDICINE

## 2021-05-03 PROCEDURE — 36415 COLL VENOUS BLD VENIPUNCTURE: CPT | Performed by: FAMILY MEDICINE

## 2021-05-03 PROCEDURE — 85027 COMPLETE CBC AUTOMATED: CPT | Performed by: FAMILY MEDICINE

## 2021-05-04 NOTE — RESULT ENCOUNTER NOTE
Dear Eric,    Here is a summary of your recent test results:  -Normal red blood cell (hgb) levels, normal white blood cell count and normal platelet levels.  -LDL(bad) cholesterol level is elevated which can increase your heart disease risk.  A diet high in fat and simple carbohydrates, genetics and being overweight can contribute to this. ADVISE: exercising 150 minutes of aerobic exercise per week (30 minutes for 5 days per week or 50 minutes for 3 days per week are options) and eating a low saturated fat/low carbohydrate diet are helpful to improve this. In 12 months, you should recheck your fasting cholesterol panel by scheduling a lab-only appointment.  -Liver and gallbladder tests (ALT,AST, Alk phos,bilirubin) are normal.  -Kidney function (GFR) is normal.  -Sodium is normal.  -Potassium is normal.  -Calcium is normal.  -Glucose is slight elevated and may be a sign of early diabetes (prediabetes). ADVISE:: eating a low carbohydrate diet, exercising, trying to lose weight (if necessary) and rechecking your glucose level in 12 months.    For additional lab test information, [email or web address] is an excellent reference.           Thank you very much for trusting me and [facility].     Have a peaceful day.    Healthy regards,  Bowen Regan MD

## 2021-10-04 ENCOUNTER — HEALTH MAINTENANCE LETTER (OUTPATIENT)
Age: 36
End: 2021-10-04

## 2021-10-20 ENCOUNTER — TRANSFERRED RECORDS (OUTPATIENT)
Dept: HEALTH INFORMATION MANAGEMENT | Facility: CLINIC | Age: 36
End: 2021-10-20

## 2022-01-05 DIAGNOSIS — I10 HYPERTENSION GOAL BP (BLOOD PRESSURE) < 130/80: ICD-10-CM

## 2022-01-06 ENCOUNTER — MYC MEDICAL ADVICE (OUTPATIENT)
Dept: FAMILY MEDICINE | Facility: CLINIC | Age: 37
End: 2022-01-06
Payer: COMMERCIAL

## 2022-01-06 DIAGNOSIS — I10 HYPERTENSION GOAL BP (BLOOD PRESSURE) < 130/80: ICD-10-CM

## 2022-01-07 RX ORDER — LISINOPRIL 5 MG/1
TABLET ORAL
Qty: 90 TABLET | Refills: 3 | OUTPATIENT
Start: 2022-01-07

## 2022-01-11 RX ORDER — LISINOPRIL 10 MG/1
10 TABLET ORAL DAILY
Qty: 90 TABLET | Refills: 3 | Status: SHIPPED | OUTPATIENT
Start: 2022-01-11 | End: 2022-04-14

## 2022-01-11 NOTE — TELEPHONE ENCOUNTER
Please see my chart message below     Please review and advise     Thank you     Yvette Dwyer RN, BSN  [place] [facility]

## 2022-04-14 ENCOUNTER — ALLIED HEALTH/NURSE VISIT (OUTPATIENT)
Dept: NURSING | Facility: CLINIC | Age: 37
End: 2022-04-14
Payer: COMMERCIAL

## 2022-04-14 VITALS
HEART RATE: 97 BPM | SYSTOLIC BLOOD PRESSURE: 138 MMHG | OXYGEN SATURATION: 100 % | DIASTOLIC BLOOD PRESSURE: 80 MMHG | WEIGHT: 196.3 LBS | BODY MASS INDEX: 28.17 KG/M2 | RESPIRATION RATE: 18 BRPM

## 2022-04-14 DIAGNOSIS — I10 HYPERTENSION GOAL BP (BLOOD PRESSURE) < 130/80: Primary | ICD-10-CM

## 2022-04-14 PROCEDURE — 99207 PR NO CHARGE NURSE ONLY: CPT

## 2022-04-14 RX ORDER — LISINOPRIL 20 MG/1
20 TABLET ORAL DAILY
Qty: 90 TABLET | Refills: 3 | Status: SHIPPED | OUTPATIENT
Start: 2022-04-14 | End: 2023-04-07

## 2022-04-14 NOTE — PROGRESS NOTES
Called # [phone]     Pt called, advised of Rx change, advised to monitor at home and send mychart with updated readings. Patient stated an understanding and agreed with plan.      Quintin Ochoa RN   [facility] [place]

## 2022-04-14 NOTE — PROGRESS NOTES
John Nicole is being followed for Blood Pressure management.      BP Readings from Last 3 Encounters:   04/14/22 (!) 144/88   04/15/21 (!) 136/92   03/20/20 136/86       Wt Readings from Last 2 Encounters:   04/14/22 89 kg (196 lb 4.8 oz)   04/15/21 88.5 kg (195 lb)       Is pulse 55 or greater? - Yes    Pulse Readings from Last 3 Encounters:   04/14/22 97   04/15/21 95   03/13/20 96       Current blood pressure medication(s):  Current Outpatient Medications   Medication Sig Dispense Refill     lisinopril (ZESTRIL) 10 MG tablet Take 1 tablet (10 mg) by mouth daily 90 tablet 3     multivitamin, therapeutic with minerals (MULTI-VITAMIN) TABS tablet Take 1 tablet by mouth daily       Plant Sterols and Stanols (CHOLEST OFF PO)             1. Follow up instructions include:     Next Provider visit: Follow up in 3 months..      SUBJECTIVE:                                                    The patient is taking medication as prescribed and is tolerating well.   Patient is monitoring Blood Pressure at home.   Last 3 home readings   131/84  118/81        Out of the following complicating factors: Cough, Headache, Lightheadedness, Shortness of breath, Fatigue, Nausea, Sexual Dysfunction, New onset of swelling or edema, Weakness and New onset of Chest Pain, the patient reports:  None    OBJECTIVE:                                                      Today's BP completed using cuff size: regular on right side arm.      Potassium   Date Value Ref Range Status   05/03/2021 4.4 3.4 - 5.3 mmol/L Final     Creatinine   Date Value Ref Range Status   05/03/2021 0.98 0.66 - 1.25 mg/dL Final     Urea Nitrogen   Date Value Ref Range Status   05/03/2021 13 7 - 30 mg/dL Final     GFR Estimate   Date Value Ref Range Status   05/03/2021 >90 >60 mL/min/[1.73_m2] Final     Comment:     Non  GFR Calc  Starting 12/18/2018, serum creatinine based estimated GFR (eGFR) will be   calculated using the Chronic Kidney Disease  Epidemiology Collaboration   (CKD-EPI) equation.           Education:  general discussion/verbal explanation  Ways to help improve BP/HTN:   Medications  Lose weight  Diet low in fat and rich in fruits, vegetables and low fat dairy products  Reduce salt in diet  Do something active for at least 30 minutes a day on most days of the week  Cut down on alcohol (if you drink more than 2 drinks per day)  Decrease stress (exercise, read, yoga, meditation, time for self, etc.)   Patient was given an opportunity to ask questions.    Patient verbalized understanding of this plan and is agreeable.    Quintin Ochoa RN

## 2022-04-14 NOTE — PROGRESS NOTES
Pt brought in form, reviewed and signed by PCP, faxed to number given. Sent to scan.    Quintin ZARATE RN   [facility]

## 2022-04-29 ENCOUNTER — MYC MEDICAL ADVICE (OUTPATIENT)
Dept: FAMILY MEDICINE | Facility: CLINIC | Age: 37
End: 2022-04-29
Payer: COMMERCIAL

## 2022-05-15 ENCOUNTER — HEALTH MAINTENANCE LETTER (OUTPATIENT)
Age: 37
End: 2022-05-15

## 2022-05-26 ENCOUNTER — VIRTUAL VISIT (OUTPATIENT)
Dept: FAMILY MEDICINE | Facility: CLINIC | Age: 37
End: 2022-05-26
Payer: COMMERCIAL

## 2022-05-26 DIAGNOSIS — U07.1 INFECTION DUE TO 2019 NOVEL CORONAVIRUS: Primary | ICD-10-CM

## 2022-05-26 PROCEDURE — 99213 OFFICE O/P EST LOW 20 MIN: CPT | Mod: 95 | Performed by: FAMILY MEDICINE

## 2022-05-26 NOTE — PROGRESS NOTES
"Eric is a 37 year old who is being evaluated via a billable video visit.      How would you like to obtain your AVS? MyChart  If the video visit is dropped, the invitation should be resent by: Text to cell phone: [phone]  Will anyone else be joining your video visit? No       Video Start Time: 9:09 AM    Assessment & Plan     1. Infection due to 2019 novel coronavirus - symptoms already improving, discussed no need for Paxlovid. Can continue with OTCs prn.       Return in about 2 months (around 7/26/2022) for Yearly Preventive Exam with PCP.    Craissa Palma MD  [employer]    Subjective   Eric is a 37 year old who presents for the following health issues     HPI     COVID-19 Symptom Review  How many days ago did these symptoms start? x4 days    Are any of the following symptoms significant for you?    New or worsening difficulty breathing? No    Worsening cough? No    Fever or chills? No    Headache: no    Sore throat: no    Chest pain: no    Diarrhea: no    Body aches? no    What treatments has patient tried? Acetaminophen   Does patient live in a nursing home, group home, or shelter? no  Does patient have a way to get food/medications during quarantined? Yes, I have a friend or family member who can help me.      Symptoms began 5 days ago.   Started feeling better over 2 days ago.   Hasn't used OTCs for the past couple of days.   No respiratory symptoms.     Has HTN, on BP medication.     Wonders if he needs Paxlovid.       Review of Systems   Constitutional, HEENT, cardiovascular, pulmonary, gi and gu systems are negative, except as otherwise noted.      Objective    Vitals - Patient Reported  Weight (Patient Reported): 88 kg (194 lb)  Height (Patient Reported): 177.8 cm (5' 10\")  BMI (Based on Pt Reported Ht/Wt): 27.84  Temperature (Patient Reported): 98.1  F (36.7  C)    Vitals:  No vitals were obtained today due to virtual visit.    Physical Exam   GENERAL: Healthy, alert " and no distress  EYES: Eyes grossly normal to inspection.  No discharge or erythema, or obvious scleral/conjunctival abnormalities.  RESP: No audible wheeze, cough, or visible cyanosis.  No visible retractions or increased work of breathing.    SKIN: Visible skin clear. No significant rash, abnormal pigmentation or lesions.  NEURO: Cranial nerves grossly intact.  Mentation and speech appropriate for age.  PSYCH: Mentation appears normal, affect normal/bright, judgement and insight intact, normal speech and appearance well-groomed.        Video-Visit Details    Type of service:  Video Visit    Video End Time:9:29 AM    Originating Location (pt. Location): Home    Distant Location (provider location):  [facility]     Platform used for Video Visit: [employer]if[employer]m

## 2022-09-11 ENCOUNTER — HEALTH MAINTENANCE LETTER (OUTPATIENT)
Age: 37
End: 2022-09-11

## 2023-04-05 DIAGNOSIS — I10 HYPERTENSION GOAL BP (BLOOD PRESSURE) < 130/80: ICD-10-CM

## 2023-04-07 RX ORDER — LISINOPRIL 20 MG/1
TABLET ORAL
Qty: 90 TABLET | Refills: 0 | Status: SHIPPED | OUTPATIENT
Start: 2023-04-07 | End: 2023-05-12

## 2023-04-07 NOTE — TELEPHONE ENCOUNTER
Due for preventative visit + med check.     Short term refill.      - Shaina, EFRAIN for Dr. Regan while he is out of clinic.

## 2023-04-07 NOTE — TELEPHONE ENCOUNTER
Routing refill request to provider for review/approval because:  Labs not current:    Patient needs to be seen because it has been more than 1 year since last office visit.    Creatinine   Date Value Ref Range Status   05/03/2021 0.98 0.66 - 1.25 mg/dL Final     Potassium   Date Value Ref Range Status   05/03/2021 4.4 3.4 - 5.3 mmol/L Final     Sherita Lema RN

## 2023-05-12 ENCOUNTER — OFFICE VISIT (OUTPATIENT)
Dept: FAMILY MEDICINE | Facility: CLINIC | Age: 38
End: 2023-05-12
Payer: COMMERCIAL

## 2023-05-12 VITALS
RESPIRATION RATE: 16 BRPM | HEART RATE: 96 BPM | DIASTOLIC BLOOD PRESSURE: 80 MMHG | HEIGHT: 70 IN | TEMPERATURE: 97.5 F | BODY MASS INDEX: 28.35 KG/M2 | SYSTOLIC BLOOD PRESSURE: 130 MMHG | OXYGEN SATURATION: 99 % | WEIGHT: 198 LBS

## 2023-05-12 DIAGNOSIS — E78.5 HYPERLIPIDEMIA LDL GOAL <100: ICD-10-CM

## 2023-05-12 DIAGNOSIS — I10 HYPERTENSION GOAL BP (BLOOD PRESSURE) < 130/80: ICD-10-CM

## 2023-05-12 DIAGNOSIS — Z00.00 ROUTINE GENERAL MEDICAL EXAMINATION AT A HEALTH CARE FACILITY: Primary | ICD-10-CM

## 2023-05-12 PROCEDURE — 99395 PREV VISIT EST AGE 18-39: CPT | Performed by: FAMILY MEDICINE

## 2023-05-12 RX ORDER — LISINOPRIL 20 MG/1
20 TABLET ORAL DAILY
Qty: 90 TABLET | Refills: 3 | Status: SHIPPED | OUTPATIENT
Start: 2023-05-12 | End: 2023-07-05

## 2023-05-12 NOTE — PROGRESS NOTES
SUBJECTIVE:   CC: Eric is an 37 year old who presents for preventative health visit.       5/12/2023     8:36 AM   Additional Questions   Roomed by Nguyen ARZATE   Patient has been advised of split billing requirements and indicates understanding: Yes  Healthy Habits:     Getting at least 3 servings of Calcium per day:  Yes    Bi-annual eye exam:  Yes    Dental care twice a year:  Yes    Sleep apnea or symptoms of sleep apnea:  None    Diet:  Regular (no restrictions)    Frequency of exercise:  6-7 days/week    Duration of exercise:  45-60 minutes    Taking medications regularly:  Yes    Barriers to taking medications:  None    Medication side effects:  None    PHQ-2 Total Score: 0    Hypertension: He presents for follow up of hypertension.  He does check blood pressure  regularly outside of the clinic. Outpatient blood pressures have not been over 140/90. He follows a low salt diet.     He eats 2-3 servings of fruits and vegetables daily.He consumes 0 sweetened beverage(s) daily.He exercises with enough effort to increase his heart rate 30 to 60 minutes per day.  He exercises with enough effort to increase his heart rate 3 or less days per week.   He is not taking prescribed medications regularly due to None.      Today's PHQ-2 Score:       5/8/2023     4:51 PM   PHQ-2 ( 1999 Pfizer)   Q1: Little interest or pleasure in doing things 0   Q2: Feeling down, depressed or hopeless 0   PHQ-2 Score 0   Q1: Little interest or pleasure in doing things Not at all   Q2: Feeling down, depressed or hopeless Not at all   PHQ-2 Score 0       Social History     Tobacco Use     Smoking status: Never     Smokeless tobacco: Never   Vaping Use     Vaping status: Never Used   Substance Use Topics     Alcohol use: Yes     Comment: One beer every few months             4/15/2021     8:34 AM   Alcohol Use   Prescreen: >3 drinks/day or >7 drinks/week? No     Reviewed orders with patient. Reviewed health maintenance and updated orders  "accordingly - Yes      Reviewed and updated as needed this visit by clinical staff   Tobacco  Allergies  Meds  Problems  Med Hx  Surg Hx  Fam Hx          Reviewed and updated as needed this visit by Provider   Tobacco  Allergies  Meds  Problems  Med Hx  Surg Hx  Fam Hx         Mr. Nicole has been tracking his blood pressures at home. He comes to the clinic with his daughter. He describes his work schedule and gets on average 6-7 hours of sleep a night.     We discussed his high cholesterol. His mom has a history of hyperlipidemia and he shares that she hasn't had success with any of the statin medications. She found she has a mutation for a cholesterol gene due to her [place]  heritage.  She's currently on a clinical trial with an injection medication that has been helping her a lot. Mr. Nicole has been trying to exercise to help bring his cholesterol levels down, but it has been difficult.     Constitutional, HEENT, cardiovascular, pulmonary, GI, , musculoskeletal, neuro, skin, endocrine and psych systems are negative, except as otherwise noted.    OBJECTIVE:   /80   Pulse 96   Temp 97.5  F (36.4  C) (Tympanic)   Resp 16   Ht 1.778 m (5' 10\")   Wt 89.8 kg (198 lb)   SpO2 99%   BMI 28.41 kg/m    EXAM:  GENERAL: healthy, alert and no distress  EYES: Eyes grossly normal to inspection, PERRL and conjunctivae and sclerae normal  HENT: ear canals and TM's normal, mouth without ulcers or lesions, pink papule on the nose that has been unchanged for years.   NECK: no adenopathy, no asymmetry, masses, or scars and thyroid normal to palpation  RESP: lungs clear to auscultation - no rales, rhonchi or wheezes  BREAST: normal without masses, tenderness or nipple discharge and no palpable axillary masses or adenopathy  CV: regular rate and rhythm, normal S1 S2, no S3 or S4, no murmur, click or rub, no peripheral edema and peripheral pulses strong  ABDOMEN: soft, nontender, no hepatosplenomegaly, " "no masses and bowel sounds normal   (male): declined  MS: no gross musculoskeletal defects noted, no edema  SKIN: no suspicious lesions or rashes  NEURO: Normal strength and tone, mentation intact and speech normal  PSYCH: mentation appears normal, affect normal/bright  LYMPH: no cervical, supraclavicular, axillary, or inguinal adenopathy  RECTAL: declined exam      ASSESSMENT/PLAN:   Routine general medical examination at a health care facility    Hypertension goal BP (blood pressure) < 130/80  Controlled - continue medication(s).  - lisinopril (ZESTRIL) 20 MG tablet  Dispense: 90 tablet; Refill: 3  - Albumin Random Urine Quantitative with Creat Ratio  - COMPREHENSIVE METABOLIC PANEL    Hyperlipidemia LDL goal <100  - Lipid panel reflex to direct LDL Fasting      COUNSELING:  Reviewed preventive health counseling, as reflected in patient instructions      BMI:   Estimated body mass index is 28.41 kg/m  as calculated from the following:    Height as of this encounter: 1.778 m (5' 10\").    Weight as of this encounter: 89.8 kg (198 lb).   Weight management plan: Discussed healthy diet and exercise guidelines     reports that he has never smoked. He has never used smokeless tobacco.      Return in about 1 year (around 5/12/2024) for wellness exam with fasting labs with Bowen Regan MD.    Follow-up Visit   Expected date:  May 12, 2023 (Approximate)      Follow Up Appointment Details:     Follow-up with whom?: Other Primary Care Services    Follow-Up for what?: Lab Visit    How?: In Person    Is this an as-needed follow-up?: No              Follow-up Visit   Expected date:  May 12, 2024 (Approximate)      Follow Up Appointment Details:     Follow-up with whom?: Me    Follow-Up for what?: Adult Preventive    How?: In Person    Is this an as-needed follow-up?: No                  Scribe Disclosure:   Chanel DENISE, am serving as a scribe to document services personally performed by Arcadio Regan MD based on data " collection and the provider's statements to me.     Provider Disclosure:  I agree with above History, Review of Systems, Physical exam and Plan.  I have reviewed the content of the documentation and have edited it as needed. I have personally performed the services documented here and the documentation accurately represents those services and the decisions I have made.      Electronically signed by:        Bowen Regan MD     [address] [address]  [email or web address]     Office: [phone]

## 2023-05-23 ENCOUNTER — LAB (OUTPATIENT)
Dept: LAB | Facility: CLINIC | Age: 38
End: 2023-05-23
Attending: FAMILY MEDICINE
Payer: COMMERCIAL

## 2023-05-23 DIAGNOSIS — I10 HYPERTENSION GOAL BP (BLOOD PRESSURE) < 130/80: ICD-10-CM

## 2023-05-23 DIAGNOSIS — E78.5 HYPERLIPIDEMIA LDL GOAL <100: ICD-10-CM

## 2023-05-23 LAB
ALBUMIN SERPL BCG-MCNC: 4.8 G/DL (ref 3.5–5.2)
ALP SERPL-CCNC: 57 U/L (ref 40–129)
ALT SERPL W P-5'-P-CCNC: 17 U/L (ref 10–50)
ANION GAP SERPL CALCULATED.3IONS-SCNC: 14 MMOL/L (ref 7–15)
AST SERPL W P-5'-P-CCNC: 24 U/L (ref 10–50)
BILIRUB SERPL-MCNC: 0.6 MG/DL
BUN SERPL-MCNC: 12.9 MG/DL (ref 6–20)
CALCIUM SERPL-MCNC: 9.7 MG/DL (ref 8.6–10)
CHLORIDE SERPL-SCNC: 99 MMOL/L (ref 98–107)
CHOLEST SERPL-MCNC: 270 MG/DL
CREAT SERPL-MCNC: 1.05 MG/DL (ref 0.67–1.17)
CREAT UR-MCNC: 74.1 MG/DL
DEPRECATED HCO3 PLAS-SCNC: 24 MMOL/L (ref 22–29)
GFR SERPL CREATININE-BSD FRML MDRD: >90 ML/MIN/1.73M2
GLUCOSE SERPL-MCNC: 101 MG/DL (ref 70–99)
HDLC SERPL-MCNC: 64 MG/DL
LDLC SERPL CALC-MCNC: 196 MG/DL
MICROALBUMIN UR-MCNC: <12 MG/L
MICROALBUMIN/CREAT UR: NORMAL MG/G{CREAT}
NONHDLC SERPL-MCNC: 206 MG/DL
POTASSIUM SERPL-SCNC: 4.7 MMOL/L (ref 3.4–5.3)
PROT SERPL-MCNC: 7.9 G/DL (ref 6.4–8.3)
SODIUM SERPL-SCNC: 137 MMOL/L (ref 136–145)
TRIGL SERPL-MCNC: 52 MG/DL

## 2023-05-23 PROCEDURE — 80061 LIPID PANEL: CPT

## 2023-05-23 PROCEDURE — 80053 COMPREHEN METABOLIC PANEL: CPT

## 2023-05-23 PROCEDURE — 82043 UR ALBUMIN QUANTITATIVE: CPT

## 2023-05-23 PROCEDURE — 82570 ASSAY OF URINE CREATININE: CPT

## 2023-05-23 PROCEDURE — 36415 COLL VENOUS BLD VENIPUNCTURE: CPT

## 2023-05-27 ENCOUNTER — MYC MEDICAL ADVICE (OUTPATIENT)
Dept: FAMILY MEDICINE | Facility: CLINIC | Age: 38
End: 2023-05-27
Payer: COMMERCIAL

## 2023-05-27 DIAGNOSIS — E78.5 HYPERLIPIDEMIA LDL GOAL <100: Primary | ICD-10-CM

## 2023-05-27 DIAGNOSIS — E78.49 FAMILIAL HYPERLIPIDEMIA: ICD-10-CM

## 2023-05-27 NOTE — RESULT ENCOUNTER NOTE
Dear Eric,    Here is a summary of your recent test results:  -Lipid panel: The ASCVD risk score returns the percentage likelihood of a first time Atherosclerotic Cardiovascular Disease (ASCVD) event.    The 10-year ASCVD risk score (Lien PARKS, et al., 2019) is: 1.7%    Values used to calculate the score:      Age: 38 years      Sex: Male      Is Non- : No      Diabetic: No      Tobacco smoker: No      Systolic Blood Pressure: 130 mmHg      Is BP treated: Yes      HDL Cholesterol: 64 mg/dL      Total Cholesterol: 270 mg/dL    -1.7% of patients that have a similar cholesterol profile to you will have a stroke, heart attack or death (related to heart disease) within the next 10 years and that is considered a low risk and cholesterol lowering medications are not  recommended at this time (high risk is >10%, or >7.5% if other risk factors such has high blood pressure or other heart disease risk factors).    -LDL(bad) cholesterol level is elevated which can increase your heart disease risk.  A diet high in fat and simple carbohydrates, genetics and being overweight can contribute to this. ADVISE: exercising 150 minutes of aerobic exercise per week (30 minutes for 5 days per week or 50 minutes for 3 days per week are options) and eating a low saturated fat/low carbohydrate diet are helpful to improve this.  -Liver and gallbladder tests (ALT,AST, Alk phos,bilirubin) are normal.  -Kidney function (GFR) is normal.  -Sodium is normal.  -Potassium is normal.  -Calcium is normal.  -Glucose is slight elevated and may be a sign of early diabetes (prediabetes). ADVISE:: eating a low carbohydrate diet, exercising, trying to lose weight (if necessary) and rechecking your glucose level in 12 months.  -Microalbumin (urine protein) test is normal.  ADVISE: rechecking this annually.    For additional lab test information, www.[email or web address] is a very good reference.     Thank you very much for trusting me and [facility].     Have a peaceful day.    Healthy regards,  Bowen Regan MD

## 2023-05-31 NOTE — TELEPHONE ENCOUNTER
Please see my chart message below     Please review and advise     Thank you     Yvette Dwyer RN, BSN  [place] [facility]

## 2023-06-06 ENCOUNTER — TELEPHONE (OUTPATIENT)
Dept: FAMILY MEDICINE | Facility: CLINIC | Age: 38
End: 2023-06-06
Payer: COMMERCIAL

## 2023-06-06 NOTE — TELEPHONE ENCOUNTER
"Note on Prior Auth, \"To initiate authorization request, please contact [employer] Program (FEP) at [phone].    Prior Authorization Retail Medication Request    Medication/Dose: [facility] SIMS for Repatha SureClick 140MG/ML Auto-injectors  ICD code (if different than what is on RX):    Previously Tried and Failed:    Rationale:      Insurance Name:    Insurance ID:  [id number]      Pharmacy Information (if different than what is on RX)  Name:  [facility]  Phone:  [phone]    "

## 2023-06-06 NOTE — TELEPHONE ENCOUNTER
Forms/Letter Request    Type of form/letter: Jose A SIMS for Repatha SureClick 140MG/ML Auto-injectors    Have you been seen for this request: N/A    Do we have the form/letter: Yes: LEXI TC bin, yellow folder    Who is the form from? Jose A SIMS for Repatha SureClick 140MG/ML Auto-injectors (if other please explain)    Where did/will the form come from? form was faxed in

## 2023-06-07 ENCOUNTER — TELEPHONE (OUTPATIENT)
Dept: FAMILY MEDICINE | Facility: CLINIC | Age: 38
End: 2023-06-07
Payer: COMMERCIAL

## 2023-06-07 NOTE — TELEPHONE ENCOUNTER
Pharmacy calls, wondering status of PA for patient's Repatha. Informed pharmacy that PA has been submitted and we are waiting to hear back.  Pharmacy thanked writer.

## 2023-06-08 ENCOUNTER — LAB (OUTPATIENT)
Dept: LAB | Facility: CLINIC | Age: 38
End: 2023-06-08
Payer: COMMERCIAL

## 2023-06-08 DIAGNOSIS — E78.49 FAMILIAL HYPERLIPIDEMIA: ICD-10-CM

## 2023-06-08 DIAGNOSIS — E78.5 HYPERLIPIDEMIA LDL GOAL <100: ICD-10-CM

## 2023-06-08 LAB — APO A-I SERPL-MCNC: 115 MG/DL

## 2023-06-08 PROCEDURE — 36415 COLL VENOUS BLD VENIPUNCTURE: CPT

## 2023-06-08 PROCEDURE — 83695 ASSAY OF LIPOPROTEIN(A): CPT

## 2023-06-10 ENCOUNTER — MYC MEDICAL ADVICE (OUTPATIENT)
Dept: FAMILY MEDICINE | Facility: CLINIC | Age: 38
End: 2023-06-10
Payer: COMMERCIAL

## 2023-06-10 DIAGNOSIS — E78.5 HYPERLIPIDEMIA LDL GOAL <100: Primary | ICD-10-CM

## 2023-06-10 DIAGNOSIS — E78.41 ELEVATED LIPOPROTEIN(A): ICD-10-CM

## 2023-06-11 NOTE — RESULT ENCOUNTER NOTE
Dear Eric,    Here is a summary of your recent test results:  -Your lipoprotein a level is elevated.    For additional lab test information, www.testing.com is a very good reference.           Thank you very much for trusting me and [facility].     Have a peaceful day.    Healthy regards,  Bowen Regan MD

## 2023-06-12 NOTE — TELEPHONE ENCOUNTER
Central Prior Authorization Team   Phone: [phone]    PA Initiation    Medication: Jose A SIMS for Repatha SureClick 140MG/ML Auto-injectors  Insurance Company: [employer] - Phone [phone] Fax [phone]  Pharmacy Filling the Rx: [id number] - [place], [place] - [address]  Filling Pharmacy Phone: [phone]  Filling Pharmacy Fax:    Start Date: 6/12/2023

## 2023-06-13 NOTE — TELEPHONE ENCOUNTER
PRIOR AUTHORIZATION DENIED    Medication: Jose A SIMS for Repatha SureClick 140MG/ML Auto-injectors    Denial Date: 6/13/2023    Denial Rational:       Appeal Information: Patient needs to initiate the appeals process per insurance plan.  Central PA cannot initiate appeal.  Patient will need to call the number on the back of their insurance card to inquire about initiation process. They will need to sign an authorization form giving permission for the clinic to represent them in the appeal. Once that is completed insurance will reach out to Central PA team if more information is needed.   \

## 2023-06-13 NOTE — TELEPHONE ENCOUNTER
Please see my chart message below     Please review and advise     Thank you     Yvette Dwyer RN, BSN  [facility]

## 2023-06-14 NOTE — TELEPHONE ENCOUNTER
Repatha SureClick 140MG/ML Auto-injectors request was denied by patient's insurance because it did not meet the plan's criteria for medical necessity.  Please change RX ir advise to appeal.    Antonieta S   Tiff

## 2023-07-04 DIAGNOSIS — I10 HYPERTENSION GOAL BP (BLOOD PRESSURE) < 130/80: ICD-10-CM

## 2023-07-05 RX ORDER — LISINOPRIL 20 MG/1
TABLET ORAL
Qty: 90 TABLET | Refills: 3 | Status: SHIPPED | OUTPATIENT
Start: 2023-07-05 | End: 2024-02-15

## 2023-07-05 NOTE — TELEPHONE ENCOUNTER
Routing refill request to provider for review/approval because:   ACE Inhibitors (Including Combos) Protocol Failed 07/04/2023 10:13 PM   Protocol Details  Recent (12 mo) or future (30 days) visit within the authorizing provider's specialty        Yvette Dwyer RN, BSN  [facility]

## 2023-07-06 NOTE — TELEPHONE ENCOUNTER
I sent the medication     Last visit in this dept:    4/14/2022     Last visit -this provider:  Visit date not found     Next visit in this dept:   Future Appointments 7/5/2023 - 1/1/2024      Date Visit Type Length Department Provider     7/11/2023 10:15 AM NEW LIPID MANAGEMENT 30 min [facility] Vince Leon MD    Location Instructions:     Our clinic is located at [address], [place] [address]. You can park your vehicle at the parking ramp west of [address] across the street from [facility]. You will cross the skyway to access our clinic on the 2nd floor.                   Health Maintenance   Topic Date Due     INFLUENZA VACCINE (1) 09/01/2023     YEARLY PREVENTIVE VISIT  05/12/2024     ANNUAL REVIEW OF HM ORDERS  05/12/2024     CMP  05/23/2024     LIPID  05/23/2024     MICROALBUMIN  05/23/2024     ADVANCE CARE PLANNING  04/15/2026     DTAP/TDAP/TD IMMUNIZATION (3 - Td or Tdap) 11/09/2028     PHQ-2 (once per calendar year)  Completed     COVID-19 Vaccine  Completed     HIV SCREENING  Addressed     Pneumococcal Vaccine: Pediatrics (0 to 5 Years) and At-Risk Patients (6 to 64 Years)  Aged Out     IPV IMMUNIZATION  Aged Out     MENINGITIS IMMUNIZATION  Aged Out     HEPATITIS C SCREENING  Discontinued     HEPATITIS B IMMUNIZATION  Discontinued        CSA -- Patient Level:    CSA: None found at the patient level.

## 2023-07-11 ENCOUNTER — OFFICE VISIT (OUTPATIENT)
Dept: CARDIOLOGY | Facility: CLINIC | Age: 38
End: 2023-07-11
Attending: FAMILY MEDICINE
Payer: COMMERCIAL

## 2023-07-11 VITALS
HEIGHT: 70 IN | OXYGEN SATURATION: 99 % | BODY MASS INDEX: 29.03 KG/M2 | SYSTOLIC BLOOD PRESSURE: 132 MMHG | DIASTOLIC BLOOD PRESSURE: 89 MMHG | WEIGHT: 202.8 LBS | HEART RATE: 83 BPM

## 2023-07-11 DIAGNOSIS — E78.41 ELEVATED LIPOPROTEIN(A): ICD-10-CM

## 2023-07-11 DIAGNOSIS — E78.01 FAMILIAL HYPERCHOLESTEROLEMIA: Primary | ICD-10-CM

## 2023-07-11 DIAGNOSIS — E78.5 HYPERLIPIDEMIA LDL GOAL <100: ICD-10-CM

## 2023-07-11 PROCEDURE — 93000 ELECTROCARDIOGRAM COMPLETE: CPT | Performed by: INTERNAL MEDICINE

## 2023-07-11 PROCEDURE — 99204 OFFICE O/P NEW MOD 45 MIN: CPT | Performed by: INTERNAL MEDICINE

## 2023-07-11 RX ORDER — ROSUVASTATIN CALCIUM 40 MG/1
40 TABLET, COATED ORAL AT BEDTIME
Qty: 90 TABLET | Refills: 3 | Status: SHIPPED | OUTPATIENT
Start: 2023-07-11 | End: 2023-08-08

## 2023-07-11 NOTE — PROGRESS NOTES
Cardiology Clinic Consultation:    Preventive Cardiology Visit    July 11, 2023   Patient Name: John Nicole  Patient MRN: [id number]    Consult indication: dyslipidemia    HPI:    I had the opportunity to see patient John Nicole in cardiology clinic for a consultation. Patient is followed by our colleague Arcadio Regan MD with Primary Care.     Patient is a pleasant 38-year-old male with a past medical history significant for dyslipidemia, family history of familial hypercholesterolemia (mother), hypertension, who presents to [facility] care.    Patient states that as a teenager he had fasting lipids done for screening prior to starting acne medication and his total cholesterol at that time was approximately 300.  Last cholesterol labs 5/23/2023 notable for total cholesterol 270, HDL 64, , LP(a) 115.  Patient was recently seen by our colleague Dr. Regan with Primary [facility] and was started on evolocumab, unfortunately this was denied by insurance.  Patient notes that his mother was recently diagnosed with familial hypercholesterolemia, and after having failed multiple statin medications, she is now on evolocumab with excellent results.    At baseline, patient is not as physically active as he would like to be, primarily due to his occupation as an , requiring odd hours throughout the day.  He denies any chest pain, chest pressure, abnormal shortness of breath.  He does not smoke cigarettes.      ECG through care everywhere report 5/17/2023 noted normal sinus rhythm.    Assessment and Plan/Recommendations:    In summary, patient is a pleasant 38-year-old male with a family history of his mother being diagnosed with familial hypercholesterolemia, patient has had severely elevated cholesterol levels since his teens, overall clinical presentation is consistent with familial hypercholesterolemia.  He denies symptoms concerning for angina or decompensated heart failure.    - We will  start rosuvastatin 40 mg nightly  - Fasting lipids with direct LDL, ALT in 3 months  - If LDL does not decrease by at least 50% to target, or if patient does not tolerate statin therapy, recommend evolocumab therapy    Thank you for allowing our team to participate in the care of Jhon Nicole.  Please do not hesitate to call or page me with any questions or concerns.    Sincerely,     Vince Leon MD, [facility]  Cardiology  July 11, 2023      Arcadio Regan MD  [address] [place] [address]      Total time spent on this encounter today: 47 minutes, providing care in this encounter including, but not limited to, reviewing prior medical records, laboratory data, imaging studies, diagnostic studies, procedure notes, formulating an assessment and plan, recommendations, discussion and counseling with patient face to face, dictation.    Past Medical History:     Patient Active Problem List   Diagnosis     Family history of BRCA2 gene positive     h/o Nephrolithiasis     Hypertension goal BP (blood pressure) < 130/80     Hyperlipidemia LDL goal <100       Past Surgical History:   Past Surgical History:   Procedure Laterality Date     NO HISTORY OF SURGERY         Medications (outpatient):  Current Outpatient Medications   Medication Sig Dispense Refill     lisinopril (ZESTRIL) 20 MG tablet TAKE 1 TABLET(20 MG) BY MOUTH DAILY (Patient taking differently: At bedtime) 90 tablet 3     multivitamin, therapeutic with minerals (MULTI-VITAMIN) TABS tablet Take 1 tablet by mouth daily       rosuvastatin (CRESTOR) 40 MG tablet Take 1 tablet (40 mg) by mouth At Bedtime 90 tablet 3     evolocumab (REPATHA) 140 MG/ML prefilled autoinjector Inject 1 mL (140 mg) Subcutaneous every 14 days (Patient not taking: Reported on 7/11/2023) 6 mL 3     Plant Sterols and Stanols (CHOLEST OFF PO)  (Patient not taking: Reported on 7/11/2023)         Allergies:  Allergies   Allergen Reactions     Seasonal Allergies   "      Social History:   History   Drug Use No      History   Smoking Status     Never   Smokeless Tobacco     Never     Social History    Substance and Sexual Activity      Alcohol use: Yes        Comment: beer a few times a year       Family History:  Family History   Problem Relation Age of Onset     Hypertension Father 35     Sarcoidosis Father      Diabetes Father      Coronary Artery Disease Maternal Grandmother      Coronary Artery Disease Maternal Grandfather      Diabetes Paternal Grandmother      Coronary Artery Disease Paternal Grandmother      Breast Cancer Mother 48     Other - See Comments Sister         BRCA2 mutation       Review of Systems:   A comprehensive 12 system review of systems was carried out.  Pertinent positives and negatives are noted above. Otherwise negative for contributory information.    Objective & Physical Exam:  /89   Pulse 83   Ht 1.778 m (5' 10\")   Wt 92 kg (202 lb 12.8 oz)   SpO2 99%   BMI 29.10 kg/m    Wt Readings from Last 2 Encounters:   07/11/23 92 kg (202 lb 12.8 oz)   05/12/23 89.8 kg (198 lb)     Body mass index is 29.1 kg/m .   Body surface area is 2.13 meters squared.    Constitutional: appears stated age, in no apparent distress, appears to be well nourished  Head: normocephalic, atraumatic  Neck: supple, trachea midline  Pulmonary: clear to auscultation bilaterally, no wheezes, no rales, no increased work of breathing  Cardiovascular: JVP normal, regular rate, regular rhythm, normal S1 and S2, no S3, S4, no murmur appreciated, no lower extremity edema  Gastrointestinal: no guarding, non-rigid   Neurologic: awake, alert, moves all extremities  Skin: no jaundice, warm on limited exam,no xanthomata  Psychiatric: affect is normal, answers questions appropriately, oriented to self and place    Data reviewed:  Lab Results   Component Value Date    WBC 6.2 05/03/2021    RBC 5.03 05/03/2021    HGB 15.5 05/03/2021    HCT 45.0 05/03/2021    MCV 90 05/03/2021    MCH " 30.8 05/03/2021    MCHC 34.4 05/03/2021    RDW 11.7 05/03/2021     05/03/2021     Sodium   Date Value Ref Range Status   05/23/2023 137 136 - 145 mmol/L Final   05/03/2021 137 133 - 144 mmol/L Final     Potassium   Date Value Ref Range Status   05/23/2023 4.7 3.4 - 5.3 mmol/L Final   05/03/2021 4.4 3.4 - 5.3 mmol/L Final     Chloride   Date Value Ref Range Status   05/23/2023 99 98 - 107 mmol/L Final   05/03/2021 104 94 - 109 mmol/L Final     Carbon Dioxide   Date Value Ref Range Status   05/03/2021 30 20 - 32 mmol/L Final     Carbon Dioxide (CO2)   Date Value Ref Range Status   05/23/2023 24 22 - 29 mmol/L Final     Anion Gap   Date Value Ref Range Status   05/23/2023 14 7 - 15 mmol/L Final   05/03/2021 3 3 - 14 mmol/L Final     Glucose   Date Value Ref Range Status   05/23/2023 101 (H) 70 - 99 mg/dL Final   05/03/2021 102 (H) 70 - 99 mg/dL Final     Urea Nitrogen   Date Value Ref Range Status   05/23/2023 12.9 6.0 - 20.0 mg/dL Final   05/03/2021 13 7 - 30 mg/dL Final     Creatinine   Date Value Ref Range Status   05/23/2023 1.05 0.67 - 1.17 mg/dL Final   05/03/2021 0.98 0.66 - 1.25 mg/dL Final     GFR Estimate   Date Value Ref Range Status   05/23/2023 >90 >60 mL/min/1.73m2 Final     Comment:     eGFR calculated using 2021 CKD-EPI equation.   05/03/2021 >90 >60 mL/min/[1.73_m2] Final     Comment:     Non  GFR Calc  Starting 12/18/2018, serum creatinine based estimated GFR (eGFR) will be   calculated using the Chronic Kidney Disease Epidemiology Collaboration   (CKD-EPI) equation.       Calcium   Date Value Ref Range Status   05/23/2023 9.7 8.6 - 10.0 mg/dL Final   05/03/2021 9.4 8.5 - 10.1 mg/dL Final     Bilirubin Total   Date Value Ref Range Status   05/23/2023 0.6 <=1.2 mg/dL Final   05/03/2021 0.7 0.2 - 1.3 mg/dL Final     Alkaline Phosphatase   Date Value Ref Range Status   05/23/2023 57 40 - 129 U/L Final   05/03/2021 54 40 - 150 U/L Final     ALT   Date Value Ref Range Status    05/23/2023 17 10 - 50 U/L Final   05/03/2021 38 0 - 70 U/L Final     AST   Date Value Ref Range Status   05/23/2023 24 10 - 50 U/L Final   05/03/2021 22 0 - 45 U/L Final     Recent Labs   Lab Test 05/23/23  1057 05/03/21  0925   CHOL 270* 267*   HDL 64 69   * 189*   TRIG 52 43

## 2023-07-11 NOTE — LETTER
7/11/2023    Arcadio Regan MD  [address] [address]    RE: John Marquezer       Dear Colleague,     I had the [facility] of seeing [facility] in [facility].      Cardiology Clinic Consultation:    Preventive Cardiology Visit    July 11, 2023   Patient Name: John Nicole  Patient MRN: [id number]    Consult indication: dyslipidemia    HPI:    I had the opportunity to see patient John Nicole in cardiology clinic for a consultation. Patient is followed by our colleague Arcadio Regan MD with Primary Care.     Patient is a pleasant 38-year-old male with a past medical history significant for dyslipidemia, family history of familial hypercholesterolemia (mother), hypertension, who presents to establish care.    Patient states that as a teenager he had fasting lipids done for screening prior to starting acne medication and his total cholesterol at that time was approximately 300.  Last cholesterol labs 5/23/2023 notable for total cholesterol 270, HDL 64, , LP(a) 115.  Patient was recently seen by our colleague Dr. Regan with Primary [facility] and was started on evolocumab, unfortunately this was denied by insurance.  Patient notes that his mother was recently diagnosed with familial hypercholesterolemia, and after having failed multiple statin medications, she is now on evolocumab with excellent results.    At baseline, patient is not as physically active as he would like to be, primarily due to his occupation as an , requiring odd hours throughout the day.  He denies any chest pain, chest pressure, abnormal shortness of breath.  He does not smoke cigarettes.      ECG through care everywhere report 5/17/2023 noted normal sinus rhythm.    Assessment and Plan/Recommendations:    In summary, patient is a pleasant 38-year-old male with a family history of his mother being diagnosed with familial hypercholesterolemia, patient has had severely elevated  cholesterol levels since his teens, overall clinical presentation is consistent with familial hypercholesterolemia.  He denies symptoms concerning for angina or decompensated heart failure.    - We will start rosuvastatin 40 mg nightly  - Fasting lipids with direct LDL, ALT in 3 months  - If LDL does not decrease by at least 50% to target, or if patient does not tolerate statin therapy, recommend evolocumab therapy    Thank you for allowing our team to participate in the care of John Nicole.  Please do not hesitate to call or page me with any questions or concerns.    Sincerely,     Vince Leon MD, [facility]  Cardiology  July 11, 2023      Arcadio Regan MD  [address] [place] [address]      Total time spent on this encounter today: 47 minutes, providing care in this encounter including, but not limited to, reviewing prior medical records, laboratory data, imaging studies, diagnostic studies, procedure notes, formulating an assessment and plan, recommendations, discussion and counseling with patient face to face, dictation.    Past Medical History:     Patient Active Problem List   Diagnosis    Family history of BRCA2 gene positive    h/o Nephrolithiasis    Hypertension goal BP (blood pressure) < 130/80    Hyperlipidemia LDL goal <100       Past Surgical History:   Past Surgical History:   Procedure Laterality Date    NO HISTORY OF SURGERY         Medications (outpatient):  Current Outpatient Medications   Medication Sig Dispense Refill    lisinopril (ZESTRIL) 20 MG tablet TAKE 1 TABLET(20 MG) BY MOUTH DAILY (Patient taking differently: At bedtime) 90 tablet 3    multivitamin, therapeutic with minerals (MULTI-VITAMIN) TABS tablet Take 1 tablet by mouth daily      rosuvastatin (CRESTOR) 40 MG tablet Take 1 tablet (40 mg) by mouth At Bedtime 90 tablet 3    evolocumab (REPATHA) 140 MG/ML prefilled autoinjector Inject 1 mL (140 mg) Subcutaneous every 14 days (Patient not taking:  "Reported on 7/11/2023) 6 mL 3    Plant Sterols and Stanols (CHOLEST OFF PO)  (Patient not taking: Reported on 7/11/2023)         Allergies:  Allergies   Allergen Reactions    Seasonal Allergies        Social History:   History   Drug Use No      History   Smoking Status    Never   Smokeless Tobacco    Never     Social History    Substance and Sexual Activity      Alcohol use: Yes        Comment: beer a few times a year       Family History:  Family History   Problem Relation Age of Onset    Hypertension Father 35    Sarcoidosis Father     Diabetes Father     Coronary Artery Disease Maternal Grandmother     Coronary Artery Disease Maternal Grandfather     Diabetes Paternal Grandmother     Coronary Artery Disease Paternal Grandmother     Breast Cancer Mother 48    Other - See Comments Sister         BRCA2 mutation       Review of Systems:   A comprehensive 12 system review of systems was carried out.  Pertinent positives and negatives are noted above. Otherwise negative for contributory information.    Objective & Physical Exam:  /89   Pulse 83   Ht 1.778 m (5' 10\")   Wt 92 kg (202 lb 12.8 oz)   SpO2 99%   BMI 29.10 kg/m    Wt Readings from Last 2 Encounters:   07/11/23 92 kg (202 lb 12.8 oz)   05/12/23 89.8 kg (198 lb)     Body mass index is 29.1 kg/m .   Body surface area is 2.13 meters squared.    Constitutional: appears stated age, in no apparent distress, appears to be well nourished  Head: normocephalic, atraumatic  Neck: supple, trachea midline  Pulmonary: clear to auscultation bilaterally, no wheezes, no rales, no increased work of breathing  Cardiovascular: JVP normal, regular rate, regular rhythm, normal S1 and S2, no S3, S4, no murmur appreciated, no lower extremity edema  Gastrointestinal: no guarding, non-rigid   Neurologic: awake, alert, moves all extremities  Skin: no jaundice, warm on limited exam,no xanthomata  Psychiatric: affect is normal, answers questions appropriately, oriented to self " and place    Data reviewed:  Lab Results   Component Value Date    WBC 6.2 05/03/2021    RBC 5.03 05/03/2021    HGB 15.5 05/03/2021    HCT 45.0 05/03/2021    MCV 90 05/03/2021    MCH 30.8 05/03/2021    MCHC 34.4 05/03/2021    RDW 11.7 05/03/2021     05/03/2021     Sodium   Date Value Ref Range Status   05/23/2023 137 136 - 145 mmol/L Final   05/03/2021 137 133 - 144 mmol/L Final     Potassium   Date Value Ref Range Status   05/23/2023 4.7 3.4 - 5.3 mmol/L Final   05/03/2021 4.4 3.4 - 5.3 mmol/L Final     Chloride   Date Value Ref Range Status   05/23/2023 99 98 - 107 mmol/L Final   05/03/2021 104 94 - 109 mmol/L Final     Carbon Dioxide   Date Value Ref Range Status   05/03/2021 30 20 - 32 mmol/L Final     Carbon Dioxide (CO2)   Date Value Ref Range Status   05/23/2023 24 22 - 29 mmol/L Final     Anion Gap   Date Value Ref Range Status   05/23/2023 14 7 - 15 mmol/L Final   05/03/2021 3 3 - 14 mmol/L Final     Glucose   Date Value Ref Range Status   05/23/2023 101 (H) 70 - 99 mg/dL Final   05/03/2021 102 (H) 70 - 99 mg/dL Final     Urea Nitrogen   Date Value Ref Range Status   05/23/2023 12.9 6.0 - 20.0 mg/dL Final   05/03/2021 13 7 - 30 mg/dL Final     Creatinine   Date Value Ref Range Status   05/23/2023 1.05 0.67 - 1.17 mg/dL Final   05/03/2021 0.98 0.66 - 1.25 mg/dL Final     GFR Estimate   Date Value Ref Range Status   05/23/2023 >90 >60 mL/min/1.73m2 Final     Comment:     eGFR calculated using 2021 CKD-EPI equation.   05/03/2021 >90 >60 mL/min/[1.73_m2] Final     Comment:     Non  GFR Calc  Starting 12/18/2018, serum creatinine based estimated GFR (eGFR) will be   calculated using the Chronic Kidney Disease Epidemiology Collaboration   (CKD-EPI) equation.       Calcium   Date Value Ref Range Status   05/23/2023 9.7 8.6 - 10.0 mg/dL Final   05/03/2021 9.4 8.5 - 10.1 mg/dL Final     Bilirubin Total   Date Value Ref Range Status   05/23/2023 0.6 <=1.2 mg/dL Final   05/03/2021 0.7 0.2 - 1.3  mg/dL Final     Alkaline Phosphatase   Date Value Ref Range Status   05/23/2023 57 40 - 129 U/L Final   05/03/2021 54 40 - 150 U/L Final     ALT   Date Value Ref Range Status   05/23/2023 17 10 - 50 U/L Final   05/03/2021 38 0 - 70 U/L Final     AST   Date Value Ref Range Status   05/23/2023 24 10 - 50 U/L Final   05/03/2021 22 0 - 45 U/L Final     Recent Labs   Lab Test 05/23/23  1057 05/03/21  0925   CHOL 270* 267*   HDL 64 69   * 189*   TRIG 52 43          Thank you for allowing me to participate in the care of your patient.      Sincerely,     Vince Leon MD     [facility]  cc:   Arcadio Regan MD  [address] [place] [address]

## 2023-07-11 NOTE — PATIENT INSTRUCTIONS
July 11, 2023    Thank you for allowing our Cardiology team to participate in your care.     Please note the following changes to your heart treatment plan:     Medication changes:   - start rosuvastatin 40mg at bedtime   - take lisinopril in the morning    Tests to be done:  - FASTING cholesterol labs in 3 months    Follow up:  - Follow up in 3 months, or sooner as needed.      For scheduling, please call [phone].      Please contact our team through [facility] or our Nurse Team Voicemail service [phone], or the General Clinic [phone] for any questions or concerns.     If you are having a medical emergency, please call [phone].     Sincerely,    Vince Leon MD, FACC  Cardiology    [facility] - [facility] - [facility] [place]

## 2023-07-18 ENCOUNTER — TELEPHONE (OUTPATIENT)
Dept: FAMILY MEDICINE | Facility: CLINIC | Age: 38
End: 2023-07-18
Payer: COMMERCIAL

## 2023-07-18 NOTE — TELEPHONE ENCOUNTER
Prior Authorization Retail Medication Request    Medication/Dose: [facility] Prior Authorization for Repatha SRCLK 140MG/ML PF Auto Inj; plan does not cover medication  ICD code (if different than what is on RX):    Previously Tried and Failed:    Rationale:      Insurance Name:    Insurance ID:   [id number]    Pharmacy Information (if different than what is on RX)  Name:  [facility]   Phone  : [phone]

## 2023-07-18 NOTE — TELEPHONE ENCOUNTER
Medication Question or Refill    Contacts       Type Contact Phone/Fax    07/18/2023 01:30 PM CDT Fax (Incoming) Eric Nicole (Self)      Jose A SIMS need          What medication are you calling about (include dose and sig)?: Repatha sureClick 140mg/ml    Preferred Pharmacy:   [id number] - [facility] [address]  AT [address] [facility] [address] [address]  Phone: [phone] Fax: [phone]      Controlled Substance Agreement on file:   CSA -- Patient Level:    CSA: None found at the patient level.       Who prescribed the medication?: Arcadio Regan    Do you need a refill? Yes,     When did you use the medication last?     Patient offered an appointment?     Do you have any questions or concerns?        Could we send this information to you in [facility] or would you prefer to receive a phone call?:     Okay to leave a detailed message?:

## 2023-07-18 NOTE — TELEPHONE ENCOUNTER
Forms/Letter Request    Type of form/letter: [facility] Prior Authorization for Repatha SRCLK 140MG/ML PF Auto Inj; plan does not cover medication    Have you been seen for this request: N/A    Do we have the form/letter: Yes:  Yellow PA Folder    Who is the form from? [facility] (if other please explain)  Fax: [phone]    Where did/will the form come from? form was faxed in

## 2023-07-20 NOTE — TELEPHONE ENCOUNTER
Prior auth requested routed to prior auth team for this medication - see 7/18/23 forms prior auth encounter. Closing encounter.

## 2023-07-22 NOTE — TELEPHONE ENCOUNTER
PA Initiation    Medication: REPATHA SURECLICK 140 MG/ML SC SOAJ  Insurance Company: [employer] - Phone [phone] Fax [phone]  Pharmacy Filling the Rx: [id number] - [place], [place] - [address]  Filling Pharmacy Phone: [phone]  Filling Pharmacy Fax:    Start Date: 7/22/2023

## 2023-07-24 NOTE — TELEPHONE ENCOUNTER
[facility] pharmacy calling to verify the denial was received  - RN said it was, see below     Yvette Dwyer RN, BSN  [facility]

## 2023-07-24 NOTE — TELEPHONE ENCOUNTER
PRIOR AUTHORIZATION DENIED    Medication: REPATHA SURECLICK 140 MG/ML SC SOAJ  Insurance Company: [employer] - Phone [phone] Fax [phone]  Denial Date: 7/24/2023  Denial Rational: Patient does not meet criteria          Appeal Information:   Patient Notified: No

## 2023-07-24 NOTE — TELEPHONE ENCOUNTER
Patient is aware that his insurance will not cover this at this time.  He should follow-up with cardiology as planned and I see that he started on rosuvastatin

## 2023-08-08 ENCOUNTER — TELEPHONE (OUTPATIENT)
Dept: CARDIOLOGY | Facility: CLINIC | Age: 38
End: 2023-08-08
Payer: COMMERCIAL

## 2023-08-08 ENCOUNTER — MYC MEDICAL ADVICE (OUTPATIENT)
Dept: CARDIOLOGY | Facility: CLINIC | Age: 38
End: 2023-08-08
Payer: COMMERCIAL

## 2023-08-08 DIAGNOSIS — E78.01 FAMILIAL HYPERCHOLESTEROLEMIA: Primary | ICD-10-CM

## 2023-08-08 RX ORDER — EVOLOCUMAB 140 MG/ML
140 INJECTION, SOLUTION SUBCUTANEOUS
Qty: 6 ML | Refills: 3 | COMMUNITY
Start: 2023-08-08 | End: 2024-01-10

## 2023-08-08 NOTE — TELEPHONE ENCOUNTER
Message from Dr. Leon:  Vince Leon MD  P [facility] Heart Team 2  Phone Number: [phone]     Thanks is there a way we can see if he'll be approved for Repatha now after having failed statins due to intolerance?    Patient updated by my chart.    We will submit again to try to provide repatha therapy.

## 2023-08-08 NOTE — TELEPHONE ENCOUNTER
Medication question - Rosuvastatin    Last Dr. Leon OV 7-11-23 -   Assessment and Plan/Recommendations:  In summary, patient is a pleasant 38-year-old male with a family history of his mother being diagnosed with familial hypercholesterolemia, patient has had severely elevated cholesterol levels since his teens, overall clinical presentation is consistent with familial hypercholesterolemia.  He denies symptoms concerning for angina or decompensated heart failure.     - We will start rosuvastatin 40 mg nightly  - Fasting lipids with direct LDL, ALT in 3 months  - If LDL does not decrease by at least 50% to target, or if patient does not tolerate statin therapy, recommend evolocumab therapy        My chart message from patient:  Kiran Leon and Team,     4 Weeks ago I was given a Rx for Rosuvstatin since insurance will not approve Repatha. Like we suspected, I had the same reaction as my Mother who went decades trying multiple different statins, with her body never accepting any of them. After a few days I started noticing sharp localized pain for a few seconds in different spots around my body foot, arm, skull/brain, etc.  Last week I had a day where it felt like all my organs were taking turns hurting. Last Friday was the last time I took Rosuvastatin. All pains are now gone.     I would like to stay off of the medication now that I feel better. Do we need to try more for insurance before we make another attempt at Repatha? I m open to options and want to work as a team to ultimately get that approved.     Thank you kindly,  John

## 2023-08-08 NOTE — TELEPHONE ENCOUNTER
Message from Dr. Leon:  Vince Leon MD  P [facility] Heart Team 2  Phone Number: [phone]     Thanks is there a way we can see if he'll be approved for Repatha now after having failed statins due to intolerance?    Reply to patient:  Kiran, Mr. Nicole,    Thank you for the update. Now that you have tried at least one statin, we can request the pharmacy team to revisit the repatha plan.    Some insurance companies request more than one try with statins or various combinations before approving the injections.    Thank you for your patience  Team 2 R.N.s  [phone]

## 2023-08-10 NOTE — TELEPHONE ENCOUNTER
Insurance is requiring one of these 3 ways patient's diagnosis of  familial hypercholesterolemia has been confirmed.     Unable to find information in chart.  Routing back to clinic to see if they can help with this question.

## 2023-08-10 NOTE — TELEPHONE ENCOUNTER
Central Prior Authorization Team   Phone: [phone]    PA Initiation    Medication: Repatha SureClick 140MG/ML auto-injectors    Insurance Company: [employer] - Phone [phone] Fax [phone]  Pharmacy Filling the Rx: [facility] - [place], [place] - [address]  Filling Pharmacy Phone: [phone]  Filling Pharmacy Fax:    Start Date: 8/10/2023

## 2023-08-11 NOTE — TELEPHONE ENCOUNTER
Vince Leon MD Anderson, Barbara E, RN; [facility] Heart Team 21 hour ago (7:44 AM)     His score on Omani criteria is 4, and probable on Venkat Pierce, so please have him see Genetics Counseling for testing for FH thanks!       Spoke to patient, reviewed insurance denial/criteria and Dr Leon's recommendation for a genetics referral. He was agreeable to plan. Order placed. Will follow along in the chart and re-submit PA pending completion of referral/testing.

## 2023-08-21 ENCOUNTER — MYC MEDICAL ADVICE (OUTPATIENT)
Dept: CARDIOLOGY | Facility: CLINIC | Age: 38
End: 2023-08-21
Payer: COMMERCIAL

## 2023-08-21 ENCOUNTER — TELEPHONE (OUTPATIENT)
Dept: CARDIOLOGY | Facility: CLINIC | Age: 38
End: 2023-08-21
Payer: COMMERCIAL

## 2023-08-21 NOTE — TELEPHONE ENCOUNTER
[facility] Call Center    Phone Message    May a detailed message be left on voicemail: yes     Reason for Call: Other: Pt requesting a call back to assist in scheduling with Arelis Perez for genetic counseling for Familial hypercholesterolemia. Please return call to assist.      Action Taken: Other: Cardiology    Travel Screening: Not Applicable    Thank you!  [facility]

## 2023-08-21 NOTE — TELEPHONE ENCOUNTER
My Chart message regarding  stopping the Rosuvastatin , is FLP repeat still needed on 10-13-23 and Oct 13, 20-23 Dr. Edward KINGSLEY needed still?   Patient has stopped the Rosuvastatin     Referral order was placed 8-11-23;   - Update - Also, nobody ever reached out to me to schedule once I was given a referral for genetic counseling. I contacted them today with the number you sent. Hopefully we will have that scheduled in the coming days     Last Dr. Edward KINGSLEY 7-11-23 -   Assessment and Plan/Recommendations:  In summary, patient is a pleasant 38-year-old male with a family history of his mother being diagnosed with familial hypercholesterolemia, patient has had severely elevated cholesterol levels since his teens, overall clinical presentation is consistent with familial hypercholesterolemia.  He denies symptoms concerning for angina or decompensated heart failure.     - We will start rosuvastatin 40 mg nightly  - Fasting lipids with direct LDL, ALT in 3 months  - If LDL does not decrease by at least 50% to target, or if patient does not tolerate statin therapy, recommend evolocumab therapy

## 2023-08-22 ENCOUNTER — TELEPHONE (OUTPATIENT)
Dept: CARDIOLOGY | Facility: CLINIC | Age: 38
End: 2023-08-22
Payer: COMMERCIAL

## 2023-08-22 NOTE — TELEPHONE ENCOUNTER
Patient's request for repatha is being processed. These questions need a answer from Dr. Leon:    Lena Greenwood     Telephone Encounter  Signed     Encounter Date: 8/8/2023       Insurance is requiring one of these 3 ways patient's diagnosis of  familial hypercholesterolemia has been confirmed.      Unable to find information in chart.  Routing back to clinic to see if they can help with this question.                        Will message Dr. Leon to review

## 2023-08-25 NOTE — TELEPHONE ENCOUNTER
Bonnie, Eric Tran [facility] Heart Team 2  Phone Number: [phone]     Hello,    It s been 4 days since I asked and have no reply. Do you still need my lab on Oct 13 and Rx followup on Oct 17? I am no longer on the medication that required the two and would like to cancel them if they are not needed.    Also, I was able to schedule my genetic counceling appt for the end of December.    Thank you,  John  ============  Reply to patient:    Mr. Bonnie Beck,    So sorry, we are still trying to get the paperwork approval for the repatha injections. The insurance requirements are taking some time and further answers from Dr. Leon.    Depending on when the approvals go through, the timing plan for October may still work. If the approval is delayed, then those appointments may need to be moved out.     We are working on this, we just don't have a final plan yet.    Thank you for your patience  Team 2 R.N.s  [phone]

## 2023-08-25 NOTE — TELEPHONE ENCOUNTER
Eric Nicole [facility] Heart Team 2  Phone Number: [phone]     Thank you. My understanding is that I need to go through all the genetic counseling and genetic testing before any Repatha approval has a chance at happening. The genetic counceling is scheduled for December.    The appts on Oct 13 & 17 were for Rosuvastatin followups. Do I still need those two? If the first step for approval is not until December, I assume I do not need anything in Oct, correct?    Thanks,  Eric    ========    Reply to patient:    Kiran, Mr. Nicole,    We'll check again with Dr. Leon and if the genetic visit is required to get your repatha approval, then it does make sense to re-schedule these appointments.    We'll route to Dr. Leon and see what he is thinking - thank you for that clarification - he is out til Monday.    Team 2 R.N.s  [phone]

## 2023-08-28 NOTE — TELEPHONE ENCOUNTER
"Reply from Dr. Leon:  Vince Leon MD  P [facility] Heart Team 2  Phone Number: [phone]     Agree can cancel the follow up appts until we have him on the Repatha      My chart message to patient:    Kiran, Mr. Nicole,    Dr. Leon said, \"I agree can cancel the follow up appts until we have him on the Repatha\"    We will ask scheduling to remove the lab appointment on 10/13/2023 and the OV with Dr. Leon on 10/17/2023.    Thank you for your patience  Team 2 R.N.s  [phone]      Message sent to scheduling to cancel October appointments.  "

## 2023-08-28 NOTE — TELEPHONE ENCOUNTER
Reply from Dr. Leon: please see previous answer    8/11/2023  His score on Marshallese criteria is 4, and probable on Venkat Pierce, so please have him see Genetics Counseling for testing for FH thanks!

## 2023-12-21 ENCOUNTER — LAB (OUTPATIENT)
Dept: LAB | Facility: CLINIC | Age: 38
End: 2023-12-21
Payer: COMMERCIAL

## 2023-12-21 ENCOUNTER — OFFICE VISIT (OUTPATIENT)
Dept: CARDIOLOGY | Facility: CLINIC | Age: 38
End: 2023-12-21
Payer: COMMERCIAL

## 2023-12-21 DIAGNOSIS — E78.01 FAMILIAL HYPERCHOLESTEROLEMIA: ICD-10-CM

## 2023-12-21 PROCEDURE — 96040 PR GENETIC COUNSELING, EACH 30 MIN: CPT | Performed by: GENETIC COUNSELOR, MS

## 2023-12-21 PROCEDURE — 99000 SPECIMEN HANDLING OFFICE-LAB: CPT | Performed by: GENETIC COUNSELOR, MS

## 2023-12-21 PROCEDURE — 36415 COLL VENOUS BLD VENIPUNCTURE: CPT | Performed by: GENETIC COUNSELOR, MS

## 2023-12-21 NOTE — LETTER
2023    Arcadio Regan MD  [address] [address]    RE: John Marquezer       Dear Colleague,     I had the pleasure of seeing John Nicole in the [facility].  Here is a copy of the progress note from your recent genetic counseling visit [facility] on Date: 2023.    PROGRESS NOTE:Eric has a personal history of dyslipidemia. He also has a family history of elevated cholesterol levels. This warrants option of genetic counseling and testing for familial hypercholesterolemia.    Medical History:Eric explained that he has had elevated cholesterol levels since high school, with his total cholesterol being in the 300s. Eric also explained that a few years ago he switched his diet and exercise routine, but his cholesterol levels did not decline.  Eric had begun to use statin medication but stopped due to side effects.     Eric s most recent cholesterol levels (2023) were: Total cholesterol = 270, LDL = 196, Triglycerides = 52, HDL = 64, Non HDL cholesterol = 206.  His score for the American criteria is a 4 and his score is probable using the Venkat Stan.     Family History:The following family history information was obtained from Eric during the session today. A copy of the pedigree can be found in the media tab.   Eric has a family history of cardiac related complications:  Mother (age 64) was diagnosed with hyperlipidemia around age 37. She has been using Repatha with success and now has a diagnosis of familial hyperlipidemia based on her lab results. She also has a history of breast cancer, diagnosed at age 48. She was found to have a BRCA2 mutation.   Maternal grandmother ( at 66) had a history of heart disease, myocardial infarction, type 2 diabetes mellitus, schizophrenia, and bipolar disorder.   Maternal grandfather ( at 66)  from atherosclerosis heart disease. He also had a history of a myocardial  infarction.   Paternal aunt (age 66) has a history of elevated cholesterol levels.     One sister is 36 years old and healthy. She has a healthy 7 year old daughter.   Father  at age 59 from sarcoidosis in the lungs. He also had a history of smoking and alcoholism.   Paternal grandmother  at age 79 from pneumonia. She had a history of pre-diabetes, hypertension, and smoking. Paternal grandfather  at age 89 due to an aortic aneurysm below the stomach. He had a history of kidney issues and smoking.   Eric has 1 daughter that is 3 years old and healthy.     There is no additional history of FH, high cholesterol, CAD, heart attacks, sudden death, or other heart related issues reported in the family.    Discussion:High cholesterol levels can be caused by a variety of factors. This can include environmental factors related to lifestyle choices such as diet and exercise. This can also include genetic factors. Some individuals have a single-gene cause and others may have changes in multiple genes (polygenic).      Familial hypercholesterolemia (FH) is a specific genetic condition caused by a mutation in one of four genes.  FH involves severely elevated LDL cholesterol levels beginning at birth, regardless of diet or exercise. Individuals with an LDL over 190 should consider having an evaluation. A change in a gene alters the protein function so the LDL cannot be taken up into the cell to be recycled. Instead, the LDL remains in the bloodstream and leads to elevated cholesterol levels.     A diagnosis of FH involves an increased risk for cardiovascular disease, chest pain, and myocardial infarctions. Untreated males are at a 50% risk for a coronary event by age 50. Untreated females are at a 30% risk for a coronary event by age 60. Medications, statins, are often used to treat FH.    Individuals with FH can also have tendon xanthomas, or the buildup of cholesterol deposits around their joints. Individuals with FH  can also have corneal arcus, or a light gray ring on the outside of their corneas (Dustin does not have these symptoms).     FH most commonly follows a dominant pattern of inheritance. In this pattern, only one gene alteration is required for disease. FH can also follow a recessive pattern of inheritance, where an alteration in both gene copies is required for disease. Determining if there is an identifiable genetic cause can provide concrete information about risks to other family members and inform treatment options.     Genetic testing was offered to Eric based on his personal history of dyslipidemia and family history of elevated cholesterol levels. We discussed the limitations of genetic testing, including a detection rate of 20-40% for idiopathic elevated cholesterol levels based on current testing technology.     We discussed the logistics of this genetic testing including a blood sample and turn around time of 2-4 weeks for results. We will meet once results are back to discuss future recommendations. We also discussed JAPSER in relation to health insurance and genetic testing.    There are 3 possible results we could receive from genetic testing of the genes in the panel:    A positive result would indicate that a pathogenic mutation was found. Treatment options and screening recommendations may change as a result of the identification of this gene. Family members are also at risk to have this pathogenic mutation. If Eric has a mutation, his daughter and sister would then have a 50% chance of inheriting the mutation. Further genetic testing may be warranted to assess their own FH risks for themselves and their family.    A negative result would indicate that no pathogenic alterations were found in the genes that were analyzed. However, this does not mean that the elevated cholesterol levels are  not genetic . There may be an alteration in one of the genes that cannot be identified with the current technology  or there may be a change in a new gene that was not tested for. Or there could be multiple genes contributing to disease.  This test does not look for polygenic causes of high cholesterol.    An uncertain result (variant of uncertain significance, VUS) would indicate that an alteration was found in a gene but current evidence cannot classify the mutation as benign or pathogenic. If any changes to the classification of the mutation occur, the patient will be notified of the change in status.    Regardless of genetic testing results, all family members should have cholesterol screening based on Eric s family history.    Plan:Eric chose to proceed with a genetic test called the familial hypercholesterolemia panel from [employer] [facility]. This test will analyze 4 genes for pathogenic mutations. The consent form was reviewed and signed during the session.     A blood sample was collected following today s session. [facility] will work with their insurance company to determine a cost estimate. They will reach out if the cost is expected to be over $100.     Results will be returned in about 2-4 weeks. Eric will receive a phone call to discuss the results and recommendations.     TOTAL TIME SPENT IN COUNSELIN minutes    ENOC Lopez   Genetic Counseling     Arelis Perez MS, [facility]  Licensed, Certified Genetic Counselor  [facility]       Thank you for allowing me to participate in the care of your patient.      Sincerely,     Arelis Perez GC     [facility]  cc:   Vince Leon MD  [address], [address]  [place] [address]

## 2023-12-22 NOTE — PROGRESS NOTES
Here is a copy of the progress note from your recent genetic counseling visit to the [facility] on Date: 2023.    PROGRESS NOTE:Eric has a personal history of dyslipidemia. He also has a family history of elevated cholesterol levels. This warrants option of genetic counseling and testing for familial hypercholesterolemia.    Medical History:Eric explained that he has had elevated cholesterol levels since high school, with his total cholesterol being in the 300s. Eric also explained that a few years ago he switched his diet and exercise routine, but his cholesterol levels did not decline.  Eric had begun to use statin medication but stopped due to side effects.     Eric s most recent cholesterol levels (2023) were: Total cholesterol = 270, LDL = 196, Triglycerides = 52, HDL = 64, Non HDL cholesterol = 206.  His score for the [place] criteria is a 4 and his score is probable using the Ven[employer] [place].     Family History:The following family history information was obtained from Eric during the session today. A copy of the pedigree can be found in the media tab.   Eric has a family history of cardiac related complications:  Mother (age 64) was diagnosed with hyperlipidemia around age 37. She has been using Repatha with success and now has a diagnosis of familial hyperlipidemia based on her lab results. She also has a history of breast cancer, diagnosed at age 48. She was found to have a BRCA2 mutation.   Maternal grandmother ( at 66) had a history of heart disease, myocardial infarction, type 2 diabetes mellitus, schizophrenia, and bipolar disorder.   Maternal grandfather ( at 66)  from atherosclerosis heart disease. He also had a history of a myocardial infarction.   Paternal aunt (age 66) has a history of elevated cholesterol levels.     One sister is 36 years old and healthy. She has a healthy 7 year old daughter.   Father  at age 59 from sarcoidosis in  the lungs. He also had a history of smoking and alcoholism.   Paternal grandmother  at age 79 from pneumonia. She had a history of pre-diabetes, hypertension, and smoking. Paternal grandfather  at age 89 due to an aortic aneurysm below the stomach. He had a history of kidney issues and smoking.   Eric has 1 daughter that is 3 years old and healthy.     There is no additional history of FH, high cholesterol, CAD, heart attacks, sudden death, or other heart related issues reported in the family.    Discussion:High cholesterol levels can be caused by a variety of factors. This can include environmental factors related to lifestyle choices such as diet and exercise. This can also include genetic factors. Some individuals have a single-gene cause and others may have changes in multiple genes (polygenic).      Familial hypercholesterolemia (FH) is a specific genetic condition caused by a mutation in one of four genes.  FH involves severely elevated LDL cholesterol levels beginning at birth, regardless of diet or exercise. Individuals with an LDL over 190 should consider having an evaluation. A change in a gene alters the protein function so the LDL cannot be taken up into the cell to be recycled. Instead, the LDL remains in the bloodstream and leads to elevated cholesterol levels.     A diagnosis of FH involves an increased risk for cardiovascular disease, chest pain, and myocardial infarctions. Untreated males are at a 50% risk for a coronary event by age 50. Untreated females are at a 30% risk for a coronary event by age 60. Medications, statins, are often used to treat FH.    Individuals with FH can also have tendon xanthomas, or the buildup of cholesterol deposits around their joints. Individuals with FH can also have corneal arcus, or a light gray ring on the outside of their corneas (Dustin does not have these symptoms).     FH most commonly follows a dominant pattern of inheritance. In this pattern, only one  gene alteration is required for disease. FH can also follow a recessive pattern of inheritance, where an alteration in both gene copies is required for disease. Determining if there is an identifiable genetic cause can provide concrete information about risks to other family members and inform treatment options.     Genetic testing was offered to Eric based on his personal history of dyslipidemia and family history of elevated cholesterol levels. We discussed the limitations of genetic testing, including a detection rate of 20-40% for idiopathic elevated cholesterol levels based on current testing technology.     We discussed the logistics of this genetic testing including a blood sample and turn around time of 2-4 weeks for results. We will meet once results are back to discuss future recommendations. We also discussed JASPER in relation to health insurance and genetic testing.    There are 3 possible results we could receive from genetic testing of the genes in the panel:    A positive result would indicate that a pathogenic mutation was found. Treatment options and screening recommendations may change as a result of the identification of this gene. Family members are also at risk to have this pathogenic mutation. If Eric has a mutation, his daughter and sister would then have a 50% chance of inheriting the mutation. Further genetic testing may be warranted to assess their own FH risks for themselves and their family.    A negative result would indicate that no pathogenic alterations were found in the genes that were analyzed. However, this does not mean that the elevated cholesterol levels are  not genetic . There may be an alteration in one of the genes that cannot be identified with the current technology or there may be a change in a new gene that was not tested for. Or there could be multiple genes contributing to disease.  This test does not look for polygenic causes of high cholesterol.    An uncertain result  (variant of uncertain significance, VUS) would indicate that an alteration was found in a gene but current evidence cannot classify the mutation as benign or pathogenic. If any changes to the classification of the mutation occur, the patient will be notified of the change in status.    Regardless of genetic testing results, all family members should have cholesterol screening based on Eric s family history.    Plan:Eric chose to proceed with a genetic test called the familial hypercholesterolemia panel from [facility]. This test will analyze 4 genes for pathogenic mutations. The consent form was reviewed and signed during the session.     A blood sample was collected following today s session. [facility] will work with their insurance company to determine a cost estimate. They will reach out if the cost is expected to be over $100.     Results will be returned in about 2-4 weeks. Eric will receive a phone call to discuss the results and recommendations.     TOTAL TIME SPENT IN COUNSELIN minutes    ENOC Lopez   Genetic Counseling     Arelis Perez MS, [facility] [place] [facility]  Licensed, Certified Genetic Counselor  [facility]

## 2023-12-22 NOTE — PATIENT INSTRUCTIONS
Indication for Genetic Counseling:     High cholesterol can be caused by both genetic and non-genetic factors.  There are lots of well known diet and lifestyle choices that are known to increase risk for high cholesterol.  However, in some families, there is an underlying genetic predisposition for high cholesterol. The genetic component can be either causative, meaning that it causes disease, or contributory, meaning that it increases risk but does not cause it out right.     One example of a causative gene is familial hypercholesterolemia (FH), a genetic condition characterized by abnormally high concentrations of low density lipoprotein cholesterol (LDL-C) in the blood since birth.  FH predisposes affected individuals to premature coronary heart disease (CHD), stroke, and death. Individuals with FH have a significantly increased risk for CHD, estimated to be 20 times greater than that of the general population. If untreated, men with FH have a 50% risk and women with FH have a 30% risk of having a cardiovascular event by ages 50 and 60 years, respectively. The prevalence of FH is estimated to be around 1:250  individuals in the general population, with an even higher prevalence in certain ethnic groups.     Inheritance:   Humans have over 20,000 genes that instruct our bodies how to function.  We have two copies of each gene because we inherit one from our mother and one from our father.  Heterozygous FH (HeFH) is inherited in an autosomal dominant manner, meaning that only one mutation is needed to cause disease. Almost all affected individuals inherit the familial mutation from an affected parent. In addition, all children of an individual with HeFH have a 50% chance of inheriting the mutation. Homozygous FH (HoFH) is rare (prevalence 1:1,000,000) and occurs when an individual inherits two mutations. Therefore, if both parents have HeFH, each of their children would have a 25% risk of inheriting HoFH and a 50%  risk of inheriting HeFH.    Testing Options:   Genetic testing is available to assess a panel of genes known to cause this condition.  This test reads through the DNA (sequencing) of these genes to look for spelling mistakes or mutations that could cause the condition.      There are three types of results you could receive from this test.     -Positive result (mutation/pathogenic variant identified) - confirms diagnosis and provides an answer to why this happened.  In addition, identifying a mutation allows family members to have testing to determine their risk.     -Negative result (mutation not identified) - no genetic changes were identified.  This does not rule out a genetic cause for the condition because not all genetic causes for this condition are known.  It is predicted that this panel will identify mutations in 30% of probable FH cases and close to 90% of definitive FH cases.   -Variant of uncertain significance (VUS) - a genetic change was identified, but there is not enough information to determine whether it is disease-causing or normal human genetic variation.     Although genetic testing may identify a mutation, it cannot provide information about the severity of symptoms or the progression of disease.  We cannot predict age of onset or severity of symptoms due to reduced penetrance and variable expressivity.    Logistics:   Genetic testing involves collecting a sample of DNA, thru blood, saliva, or cheek cells.  The sample will be sent to a laboratory to extract the DNA and sequence the genes for mutations.  The laboratory will work with your insurance company to determine the out of pocket (OOP) cost and will notify you if the OOP cost is greater than $100.  Remember to ask the lab about financial assistance pricing and self pay options as well.  Sometimes those are much lower than insurance pricing.  When testing is initiated, results take about 2-4 weeks to return. I will contact you over the  phone when results are available.     Genetic Information and Nondiscrimination Act:  The Genetic Information and Nondiscrimination Act of 2008 (JASPER) is a federal law that protects individuals from genetic discrimination in health insurance and employment. Genetic discrimination is defined as the misuse of genetic information. This law does not address potential discrimination regarding life insurance or disability insurance.      This is especially relevant for at risk individuals who are considering presymptomatic testing.    Screening Recommendations:  Recommend that first degree relatives, including parents, siblings and children, be screened regularly for cholesterol levels starting in childhood.    Resources:  The FH Foundation - familyheart.org    General   American Heart Association - americanheart.org  Genetics Home Reference - ghr.nlm.nih.gov  Genetic Information and Nondiscrimination Act - ginahelp.org    Contact Information:  Arelis Perez MS  Licensed Genetic Counselor  [facility]  [facility]    Office:  [phone]  Appointments:  [phone]  Fax: [phone]  Email: [email or web address]

## 2024-01-02 ENCOUNTER — TELEPHONE (OUTPATIENT)
Dept: CARDIOLOGY | Facility: CLINIC | Age: 39
End: 2024-01-02
Payer: COMMERCIAL

## 2024-01-02 LAB — SCANNED LAB RESULT: NORMAL

## 2024-01-02 NOTE — TELEPHONE ENCOUNTER
Spoke with John today to review results of genetic testing. He underwent genetic testing on December 21, 2023 due to his diagnosis of high cholesterol and the suspicion of familial hypercholesterolemia (FH).     Genetic testing for the FH panel thru [facility]  revealed that Eric does NOT carry a mutation in the 4 genes analyzed. It is predicted that this panel will identify mutations in 30% of probable FH cases and close to 90% of definitive FH cases.     Therefore, this negative result does not rule out a genetic basis for pts high cholesterol, but eliminates the option of presymptomatic testing in at risk family members, at this time.    However, since this condition could still be genetic, clinical screening is recommended in all first degree relatives (children, siblings, parents).  Clinical screening should include lipid levels to assess their current status.     Explained that with continued research and genetic knowledge the detection rate for identifying mutations may increase over time. Therefore, it is worth touching base in the years to come to learn about new testing options.     A summary letter and copy of the results will be sent to patient. All questions answered at this time.     Arelis Perez MS, [facility]  Licensed, Certified Genetic Counselor  [facility]  [facility]

## 2024-01-09 ENCOUNTER — TELEPHONE (OUTPATIENT)
Dept: CARDIOLOGY | Facility: CLINIC | Age: 39
End: 2024-01-09
Payer: COMMERCIAL

## 2024-01-09 DIAGNOSIS — E78.01 FAMILIAL HYPERCHOLESTEROLEMIA: Primary | ICD-10-CM

## 2024-01-09 NOTE — TELEPHONE ENCOUNTER
Patient completed his genetics consult for familial hyperlipidemia.    Per update from Arelis Perez 1/2/2024:  Genetic testing for the FH panel thru [facility]  revealed that Eric does NOT carry a mutation in the 4 genes analyzed. It is predicted that this panel will identify mutations in 30% of probable FH cases and close to 90% of definitive FH cases.      Therefore, this negative result does not rule out a genetic basis for pts high cholesterol, but eliminates the option of presymptomatic testing in at risk family members, at this time.    Patient was denied the use of repatha unless he had testing to qualify for its use.  Patient started crestor 40mg in July with no improvement.    Will message Dr. Leon to review

## 2024-01-10 RX ORDER — EZETIMIBE 10 MG/1
10 TABLET ORAL DAILY
Qty: 90 TABLET | Refills: 3 | Status: SHIPPED | OUTPATIENT
Start: 2024-01-10 | End: 2024-02-15 | Stop reason: SINTOL

## 2024-01-10 NOTE — TELEPHONE ENCOUNTER
Patient called back and the FAA representative approved the use of zetia. Rx escripted. Patient will set up an appointment with labs in 3 months.

## 2024-01-10 NOTE — TELEPHONE ENCOUNTER
Reply from Dr. Leon:  Vince Leon MD Anderson, Ela AYON RN  Cc: VENU Tran [facility] Team 2  Caller: Unspecified (Yesterday,  8:58 AM)  Testing negative for familial hypercholesterolemia, since rosuvastatin 40mg at bedtime was not as effective as we would have hoped it would be, recommend we start ezetimibe 10mg daily with repeat fasting lipids and ALT in 3 mo thanks    Orders placed for follow up and lipids/alt in 3 months.  Repatha removed from med list as it is not approved for his use.    Attempted to contact patient to discuss Dr. Leon's recommendation to start zetia 10mg daily.  1) order zetia 10mg daily  2) share contact with [facility] to set up appointments  Left message for patient to call back to Team 2 R.N.s @ [phone] spoke with patient. 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 He asks that we send him the zetia information so he can submit it. Once the FAA oks the use, he is willing to try it.  Patient expressed frustration that he cannot get the repatha approved.    My chart update:    Kiran, Mr. Nicole,    The new medication for your cholesterol is ezetimibe/zetia 10mg daily.    Once the FAA approves this, update us with your preferred pharmacy.    Dr. Leon would like a visit and labs in 3 months to see how this is working for you: call scheduling at [phone].    Thank you  Team 2 R.N.s  [phone]

## 2024-01-25 ENCOUNTER — MYC MEDICAL ADVICE (OUTPATIENT)
Dept: CARDIOLOGY | Facility: CLINIC | Age: 39
End: 2024-01-25
Payer: COMMERCIAL

## 2024-01-25 NOTE — TELEPHONE ENCOUNTER
See mychart encounter/images, pt wonders if rash is from zetia. Dermatology felt it to be viral per patient. Dr Leon messaged.

## 2024-01-26 NOTE — TELEPHONE ENCOUNTER
Vince Leon MD  P Tr[facility] Heart Team 2  Phone Number: [phone]     Unfortunate he has this rash, I would defer to the Dermatologist but it doesn't look like a classic drug rash to me, if he continues the ezetimibe and it goes away then it would be less likely a drug reaction, alternatively if he stops the ezetimibe and it goes away then we wouldn't know if it was a viral rash or a drug reaction unless we re-challenged with ezetimibe after it clears and it came back, I think it makes the most sense to refer him to an Allergist    Dr. Leon's reply sent to patient

## 2024-02-15 ENCOUNTER — OFFICE VISIT (OUTPATIENT)
Dept: FAMILY MEDICINE | Facility: CLINIC | Age: 39
End: 2024-02-15
Payer: COMMERCIAL

## 2024-02-15 VITALS
RESPIRATION RATE: 16 BRPM | TEMPERATURE: 98.1 F | BODY MASS INDEX: 28.06 KG/M2 | DIASTOLIC BLOOD PRESSURE: 78 MMHG | WEIGHT: 196 LBS | OXYGEN SATURATION: 99 % | HEIGHT: 70 IN | HEART RATE: 85 BPM | SYSTOLIC BLOOD PRESSURE: 132 MMHG

## 2024-02-15 DIAGNOSIS — E78.01 FAMILIAL HYPERCHOLESTEROLEMIA: ICD-10-CM

## 2024-02-15 DIAGNOSIS — Z00.00 ROUTINE GENERAL MEDICAL EXAMINATION AT A HEALTH CARE FACILITY: Primary | ICD-10-CM

## 2024-02-15 DIAGNOSIS — Z13.1 SCREENING FOR DIABETES MELLITUS: ICD-10-CM

## 2024-02-15 DIAGNOSIS — L30.9 DERMATITIS: ICD-10-CM

## 2024-02-15 DIAGNOSIS — Z13.29 SCREENING FOR THYROID DISORDER: ICD-10-CM

## 2024-02-15 DIAGNOSIS — I10 HYPERTENSION GOAL BP (BLOOD PRESSURE) < 130/80: ICD-10-CM

## 2024-02-15 DIAGNOSIS — K92.1 HEMATOCHEZIA: ICD-10-CM

## 2024-02-15 LAB
ALBUMIN SERPL BCG-MCNC: 4.8 G/DL (ref 3.5–5.2)
ALP SERPL-CCNC: 65 U/L (ref 40–150)
ALT SERPL W P-5'-P-CCNC: 24 U/L (ref 0–70)
ANION GAP SERPL CALCULATED.3IONS-SCNC: 11 MMOL/L (ref 7–15)
AST SERPL W P-5'-P-CCNC: 21 U/L (ref 0–45)
BILIRUB SERPL-MCNC: 0.7 MG/DL
BUN SERPL-MCNC: 12.9 MG/DL (ref 6–20)
CALCIUM SERPL-MCNC: 10.2 MG/DL (ref 8.6–10)
CHLORIDE SERPL-SCNC: 99 MMOL/L (ref 98–107)
CREAT SERPL-MCNC: 0.97 MG/DL (ref 0.67–1.17)
DEPRECATED HCO3 PLAS-SCNC: 27 MMOL/L (ref 22–29)
EGFRCR SERPLBLD CKD-EPI 2021: >90 ML/MIN/1.73M2
ERYTHROCYTE [DISTWIDTH] IN BLOOD BY AUTOMATED COUNT: 11.9 % (ref 10–15)
GLUCOSE SERPL-MCNC: 113 MG/DL (ref 70–99)
HBA1C MFR BLD: 6 % (ref 0–5.6)
HCT VFR BLD AUTO: 46.6 % (ref 40–53)
HGB BLD-MCNC: 15.5 G/DL (ref 13.3–17.7)
MCH RBC QN AUTO: 30.3 PG (ref 26.5–33)
MCHC RBC AUTO-ENTMCNC: 33.3 G/DL (ref 31.5–36.5)
MCV RBC AUTO: 91 FL (ref 78–100)
PLATELET # BLD AUTO: 237 10E3/UL (ref 150–450)
POTASSIUM SERPL-SCNC: 4.7 MMOL/L (ref 3.4–5.3)
PROT SERPL-MCNC: 7.6 G/DL (ref 6.4–8.3)
RBC # BLD AUTO: 5.11 10E6/UL (ref 4.4–5.9)
SODIUM SERPL-SCNC: 137 MMOL/L (ref 135–145)
TSH SERPL DL<=0.005 MIU/L-ACNC: 1.12 UIU/ML (ref 0.3–4.2)
WBC # BLD AUTO: 6.2 10E3/UL (ref 4–11)

## 2024-02-15 PROCEDURE — 85027 COMPLETE CBC AUTOMATED: CPT | Performed by: NURSE PRACTITIONER

## 2024-02-15 PROCEDURE — 36415 COLL VENOUS BLD VENIPUNCTURE: CPT | Performed by: NURSE PRACTITIONER

## 2024-02-15 PROCEDURE — 82274 ASSAY TEST FOR BLOOD FECAL: CPT | Performed by: NURSE PRACTITIONER

## 2024-02-15 PROCEDURE — 80053 COMPREHEN METABOLIC PANEL: CPT | Performed by: NURSE PRACTITIONER

## 2024-02-15 PROCEDURE — 99213 OFFICE O/P EST LOW 20 MIN: CPT | Mod: 25 | Performed by: NURSE PRACTITIONER

## 2024-02-15 PROCEDURE — 84443 ASSAY THYROID STIM HORMONE: CPT | Performed by: NURSE PRACTITIONER

## 2024-02-15 PROCEDURE — 99396 PREV VISIT EST AGE 40-64: CPT | Performed by: NURSE PRACTITIONER

## 2024-02-15 PROCEDURE — 83036 HEMOGLOBIN GLYCOSYLATED A1C: CPT | Performed by: NURSE PRACTITIONER

## 2024-02-15 RX ORDER — LISINOPRIL 20 MG/1
20 TABLET ORAL DAILY
Qty: 90 TABLET | Refills: 3 | Status: SHIPPED | OUTPATIENT
Start: 2024-02-15 | End: 2024-05-13

## 2024-02-15 RX ORDER — EZETIMIBE 10 MG/1
10 TABLET ORAL DAILY
Start: 2024-02-15 | End: 2024-04-25

## 2024-02-15 ASSESSMENT — PAIN SCALES - GENERAL: PAINLEVEL: NO PAIN (0)

## 2024-02-15 NOTE — PROGRESS NOTES
"  Assessment & Plan     Dermatitis  Follow up with derm  Trial of allergy medication as needed     Hypertension goal BP (blood pressure) < 130/80  Check labs  Continue with medication controlled  - CBC with platelets  - Comprehensive metabolic panel (BMP + Alb, Alk Phos, ALT, AST, Total. Bili, TP)    Routine general medical examination at a health care facility  completed    Screening for thyroid disorder  Check labs  - TSH with free T4 reflex    Screening for diabetes mellitus  Check labs  - Hemoglobin A1c    Hematochezia  Will do if blood in toilet returns   - Fecal colorectal cancer screen (FIT)      BMI  Estimated body mass index is 28.12 kg/m  as calculated from the following:    Height as of this encounter: 1.778 m (5' 10\").    Weight as of this encounter: 88.9 kg (196 lb).   Weight management plan: Discussed healthy diet and exercise guidelines      Valencia Reyes is a 38 year old, presenting for the following health issues:  Derm Problem (Itching all over in random spots of his body x 5 weeks. At times has red spots. Would like some labs done (CBC, CMP, CEA)- fasting.  Does have high cholesterol, does have some stress and high blood pressure. Has seen a dermatologist.  Did notice some red in his stool last night, wonders if is related to valentines day frosting or could be blood. )        2/15/2024     7:56 AM   Additional Questions   Roomed by Brianda Gomez CMA   Accompanied by Self     History of Present Illness       Reason for visit:  Itchy skin all over body for a little over a month. Already seen a dermatologist and have a followup on the 22nd, but would like to see a GP to do labs and make sure blood work comes back okay.  Symptom onset:  More than a month  Symptoms include:  Itching skin all over. As of today, the 13th, also had normal looking stool but the settled water around it was a little red.  Symptom intensity:  Mild  Symptom progression:  Staying the same  Had these symptoms before:  " "No    He eats 2-3 servings of fruits and vegetables daily.He consumes 0 sweetened beverage(s) daily.He exercises with enough effort to increase his heart rate 30 to 60 minutes per day.  He exercises with enough effort to increase his heart rate 4 days per week.   He is taking medications regularly.       Red spots that have been showing up and all over and then he has had an itch and scalp generalized since jan and he is unsure if it is weather related and he does have a follow up. He has used lotion and it has helped. Has not taken allergy medication.     He is having some red coloring seeping from his stool, he did have three of then and he is wanting to make sure       Review of Systems  Constitutional, HEENT, cardiovascular, pulmonary, gi and gu systems are negative, except as otherwise noted.      Objective    /78   Pulse 85   Temp 98.1  F (36.7  C) (Oral)   Resp 16   Ht 1.778 m (5' 10\")   Wt 88.9 kg (196 lb)   SpO2 99%   BMI 28.12 kg/m    Body mass index is 28.12 kg/m .  Physical Exam   GENERAL: alert and no distress  NECK: no adenopathy, no asymmetry, masses, or scars  RESP: lungs clear to auscultation - no rales, rhonchi or wheezes  CV: regular rate and rhythm, normal S1 S2, no S3 or S4, no murmur, click or rub, no peripheral edema  ABDOMEN: soft, nontender, no hepatosplenomegaly, no masses and bowel sounds normal  MS: no gross musculoskeletal defects noted, no edema          Signed Electronically by: Jodie Valentin NP    "

## 2024-02-15 NOTE — PATIENT INSTRUCTIONS
Check with cardiology on CT calcium score  Discuss with job kristen, claritin or allegra 2nd generation anti-histamine

## 2024-02-16 LAB — HEMOCCULT STL QL IA: NEGATIVE

## 2024-04-25 ENCOUNTER — MYC MEDICAL ADVICE (OUTPATIENT)
Dept: CARDIOLOGY | Facility: CLINIC | Age: 39
End: 2024-04-25
Payer: COMMERCIAL

## 2024-04-25 DIAGNOSIS — E78.01 FAMILIAL HYPERCHOLESTEROLEMIA: ICD-10-CM

## 2024-04-25 RX ORDER — EZETIMIBE 10 MG/1
10 TABLET ORAL DAILY
Qty: 90 TABLET | Refills: 1 | Status: SHIPPED | OUTPATIENT
Start: 2024-04-25 | End: 2024-05-13

## 2024-04-25 NOTE — TELEPHONE ENCOUNTER
[facility] Cardiology Refill Guideline reviewed.  Medication meets criteria for refill. Follow up and labs are scheduled for August.     Also see mychart encounter regarding rash update.

## 2024-05-06 SDOH — HEALTH STABILITY: PHYSICAL HEALTH: ON AVERAGE, HOW MANY DAYS PER WEEK DO YOU ENGAGE IN MODERATE TO STRENUOUS EXERCISE (LIKE A BRISK WALK)?: 5 DAYS

## 2024-05-06 SDOH — HEALTH STABILITY: PHYSICAL HEALTH: ON AVERAGE, HOW MANY MINUTES DO YOU ENGAGE IN EXERCISE AT THIS LEVEL?: 60 MIN

## 2024-05-06 ASSESSMENT — SOCIAL DETERMINANTS OF HEALTH (SDOH): HOW OFTEN DO YOU GET TOGETHER WITH FRIENDS OR RELATIVES?: PATIENT DECLINED

## 2024-05-13 ENCOUNTER — OFFICE VISIT (OUTPATIENT)
Dept: FAMILY MEDICINE | Facility: CLINIC | Age: 39
End: 2024-05-13
Attending: FAMILY MEDICINE
Payer: COMMERCIAL

## 2024-05-13 VITALS
HEIGHT: 70 IN | TEMPERATURE: 98.1 F | BODY MASS INDEX: 27.35 KG/M2 | WEIGHT: 191 LBS | RESPIRATION RATE: 16 BRPM | SYSTOLIC BLOOD PRESSURE: 128 MMHG | OXYGEN SATURATION: 100 % | DIASTOLIC BLOOD PRESSURE: 78 MMHG | HEART RATE: 105 BPM

## 2024-05-13 DIAGNOSIS — I10 HYPERTENSION GOAL BP (BLOOD PRESSURE) < 130/80: ICD-10-CM

## 2024-05-13 DIAGNOSIS — R73.03 PREDIABETES: ICD-10-CM

## 2024-05-13 DIAGNOSIS — R19.5 LOOSE STOOLS: ICD-10-CM

## 2024-05-13 DIAGNOSIS — E78.01 FAMILIAL HYPERCHOLESTEROLEMIA: Primary | ICD-10-CM

## 2024-05-13 LAB
ALT SERPL W P-5'-P-CCNC: 24 U/L (ref 0–70)
CHOLEST SERPL-MCNC: 229 MG/DL
CREAT UR-MCNC: 17 MG/DL
FASTING STATUS PATIENT QL REPORTED: YES
HDLC SERPL-MCNC: 73 MG/DL
LDLC SERPL CALC-MCNC: 147 MG/DL
MICROALBUMIN UR-MCNC: <12 MG/L
MICROALBUMIN/CREAT UR: NORMAL MG/G{CREAT}
NONHDLC SERPL-MCNC: 156 MG/DL
TRIGL SERPL-MCNC: 47 MG/DL

## 2024-05-13 PROCEDURE — 82043 UR ALBUMIN QUANTITATIVE: CPT | Performed by: FAMILY MEDICINE

## 2024-05-13 PROCEDURE — 84460 ALANINE AMINO (ALT) (SGPT): CPT | Performed by: FAMILY MEDICINE

## 2024-05-13 PROCEDURE — 80061 LIPID PANEL: CPT | Performed by: FAMILY MEDICINE

## 2024-05-13 PROCEDURE — 36415 COLL VENOUS BLD VENIPUNCTURE: CPT | Performed by: FAMILY MEDICINE

## 2024-05-13 PROCEDURE — 99213 OFFICE O/P EST LOW 20 MIN: CPT | Performed by: FAMILY MEDICINE

## 2024-05-13 PROCEDURE — 82570 ASSAY OF URINE CREATININE: CPT | Performed by: FAMILY MEDICINE

## 2024-05-13 RX ORDER — LISINOPRIL 20 MG/1
20 TABLET ORAL DAILY
Qty: 90 TABLET | Refills: 4 | Status: SHIPPED | OUTPATIENT
Start: 2024-05-13

## 2024-05-13 RX ORDER — EZETIMIBE 10 MG/1
10 TABLET ORAL DAILY
Qty: 90 TABLET | Refills: 4 | Status: SHIPPED | OUTPATIENT
Start: 2024-05-13

## 2024-05-13 NOTE — PROGRESS NOTES
Preventive Care Visit  [facility]  Arcadio Regan MD, Family Medicine  May 13, 2024      Assessment & Plan   There are no diagnoses linked to this encounter.        No follow-ups on file.          Bowen Regan MD      [address] [address]  [employer]   Office: [phone]       Valencia Reyes is a 38 year old, presenting for the following:  Medication check (had physical billed a couple of months ago)    Health Care Directive  Patient does not have a Health Care Directive or Living Will: Discussed advance care planning with patient; however, patient declined at this time.    HPI    Possible IBS - frequently has 6-7 bowel movements per day.  Nonbloody.  No constipation.     Hypertension Follow-up    Do you check your blood pressure regularly outside of the clinic? Yes   Are you following a low salt diet? Yes  Are your blood pressures ever more than 140 on the top number (systolic) OR more   than 90 on the bottom number (diastolic), for example 140/90? No          5/6/2024   General Health   How would you rate your overall physical health? Good   Feel stress (tense, anxious, or unable to sleep) Not at all         5/6/2024   Nutrition   Three or more servings of calcium each day? Yes   Diet: Low salt    Low fat/cholesterol    Diabetic    Carbohydrate counting    Vegetarian/vegan    Gluten-free/reduced   How many servings of fruit and vegetables per day? (!) 2-3   How many sweetened beverages each day? 0-1         5/6/2024   Exercise   Days per week of moderate/strenous exercise 5 days   Average minutes spent exercising at this level 60 min         5/6/2024   Social Factors   Frequency of gathering with friends or relatives Patient declined   Worry food won't last until get money to buy more No   Food not last or not have enough money for food? No   Do you have housing?  Yes   Are you worried about losing your housing? No  "  Lack of transportation? No   Unable to get utilities (heat,electricity)? No         5/6/2024   Dental   Dentist two times every year? Yes         5/6/2024   TB Screening   Were you born outside of the [place]? No               5/6/2024   Substance Use   Alcohol more than 3/day or more than 7/wk Not Applicable   Do you use any other substances recreationally? No     Social History     Tobacco Use    Smoking status: Never    Smokeless tobacco: Never   Vaping Use    Vaping status: Never Used   Substance Use Topics    Alcohol use: Yes     Comment: beer a few times a year    Drug use: No           5/6/2024   STI Screening   New sexual partner(s) since last STI/HIV test? No   ASCVD Risk   The 10-year ASCVD risk score (Lien PARKS, et al., 2019) is: 1.7%    Values used to calculate the score:      Age: 38 years      Sex: Male      Is Non- : No      Diabetic: No      Tobacco smoker: No      Systolic Blood Pressure: 128 mmHg      Is BP treated: Yes      HDL Cholesterol: 64 mg/dL      Total Cholesterol: 270 mg/dL        5/6/2024   Contraception/Family Planning   Questions about contraception or family planning No        Reviewed and updated as needed this visit by Provider                     Objective    Exam  /78   Pulse 105   Temp 98.1  F (36.7  C) (Tympanic)   Resp 16   Ht 1.778 m (5' 10\")   Wt 86.6 kg (191 lb)   SpO2 100%   BMI 27.41 kg/m     Estimated body mass index is 27.41 kg/m  as calculated from the following:    Height as of this encounter: 1.778 m (5' 10\").    Weight as of this encounter: 86.6 kg (191 lb).    Physical Exam  GENERAL: healthy, alert and no distress  EYES: Eyes grossly normal to inspection, PERRL and conjunctivae and sclerae normal  HENT: ear canals and TM's normal, nose and mouth without ulcers or lesions  NECK: no adenopathy, no asymmetry, masses, or scars and thyroid normal to palpation  RESP: lungs clear to auscultation - no rales, rhonchi or " wheezes  BREAST: normal without masses, tenderness or nipple discharge and no palpable axillary masses or adenopathy  CV: regular rate and rhythm, normal S1 S2, no S3 or S4, no murmur, click or rub, no peripheral edema and peripheral pulses strong  ABDOMEN: soft, nontender, no hepatosplenomegaly, no masses and bowel sounds normal   (male): normal male genitalia without lesions or urethral discharge, no hernia  MS: no gross musculoskeletal defects noted, no edema  SKIN: no suspicious lesions or rashes  NEURO: Normal strength and tone, mentation intact and speech normal  PSYCH: mentation appears normal, affect normal/bright  LYMPH: no cervical, supraclavicular, axillary, or inguinal adenopathy  RECTAL: declined exam        Signed Electronically by: Arcadio Regan MD

## 2024-05-13 NOTE — PATIENT INSTRUCTIONS
"Preventive Care Advice   This is general advice we often give to help people stay healthy. Your care team may have specific advice just for you. Please talk to your care team about your own preventive care needs.  Lifestyle  Exercise at least 150 minutes each week (30 minutes a day, 5 days a week).  Do muscle strengthening activities 2 days a week. These help control your weight and prevent disease.  No smoking.  Wear sunscreen to prevent skin cancer.  Have your home tested for radon every 2 to 5 years. Radon is a colorless, odorless gas that can harm your lungs. To learn more, go to www.health.[facility].mn. and search for \"Radon in Homes.\"  Keep guns unloaded and locked up in a safe place like a safe or gun vault, or, use a gun lock and hide the keys. Always lock away bullets separately. To learn more, visit [employer].[place].gov and search for \"safe gun storage.\"  Nutrition  Eat 5 or more servings of fruits and vegetables each day.  Try wheat bread, brown rice and whole grain pasta (instead of white bread, rice, and pasta).  Get enough calcium and vitamin D. Check the label on foods and aim for 100% of the RDA (recommended daily allowance).  Regular exams  Have a dental exam and cleaning every 6 months.  See your health care team every year to talk about:  Any changes in your health.  Any medicines your care team has prescribed.  Preventive care, family planning, and ways to prevent chronic diseases.  Shots (vaccines)   HPV shots (up to age 26), if you've never had them before.  Hepatitis B shots (up to age 59), if you've never had them before.  COVID-19 shot: Get this shot when it's due.  Flu shot: Get a flu shot every year.  Tetanus shot: Get a tetanus shot every 10 years.  Pneumococcal, hepatitis A, and RSV shots: Ask your care team if you need these based on your risk.  Shingles shot (for age 50 and up).  General health tests  Diabetes screening:  Starting at age 35, Get screened for diabetes at least every 3 years.  If " you are younger than age 35, ask your care team if you should be screened for diabetes.  Cholesterol test: At age 39, start having a cholesterol test every 5 years, or more often if advised.  Bone density scan (DEXA): At age 50, ask your care team if you should have this scan for osteoporosis (brittle bones).  Hepatitis C: Get tested at least once in your life.  Abdominal aortic aneurysm screening: Talk to your doctor about having this screening if you:  Have ever smoked; and  Are biologically male; and  Are between the ages of 65 and 75.  STIs (sexually transmitted infections)  Before age 24: Ask your care team if you should be screened for STIs.  After age 24: Get screened for STIs if you're at risk. You are at risk for STIs (including HIV) if:  You are sexually active with more than one person.  You don't use condoms every time.  You or a partner was diagnosed with a sexually transmitted infection.  If you are at risk for HIV, ask about PrEP medicine to prevent HIV.  Get tested for HIV at least once in your life, whether you are at risk for HIV or not.  Cancer screening tests  Cervical cancer screening: If you have a cervix, begin getting regular cervical cancer screening tests at age 21. Most people who have regular screenings with normal results can stop after age 65. Talk about this with your provider.  Breast cancer scan (mammogram): If you've ever had breasts, begin having regular mammograms starting at age 40. This is a scan to check for breast cancer.  Colon cancer screening: It is important to start screening for colon cancer at age 45.  Have a colonoscopy test every 10 years (or more often if you're at risk) Or, ask your provider about stool tests like a FIT test every year or Cologuard test every 3 years.  To learn more about your testing options, visit: www.[employer]/156231.pdf.  For help making a decision, visit: marielean/[email or web address].  Prostate cancer screening test: If you have a prostate and are age 55  to 69, ask your provider if you would benefit from a yearly prostate cancer screening test.  Lung cancer screening: If you are a current or former smoker age 50 to 80, ask your care team if ongoing lung cancer screenings are right for you.  For informational purposes only. Not to replace the advice of your health care provider. Copyright   2023 [employer]. All rights reserved. Clinically reviewed by the [facility] Transitions Program. [employer] [id number] - REV 04/24.

## 2024-05-14 NOTE — RESULT ENCOUNTER NOTE
Dear Eric,    Here is a summary of your recent test results:  -Microalbumin (urine protein) test is normal.  ADVISE: rechecking this annually.    For additional lab test information, www.testing.com is a very good reference.             Thank you very much for trusting me and [facility].     Have a peaceful day.    Healthy regards,  Bowen Regan MD

## 2024-05-19 NOTE — RESULT ENCOUNTER NOTE
Lipids are still elevated, however improved with the ezetimibe. I am glad his rash improved and it was not likely related to the ezetimibe. Repatha was declined by his insurance in the past given the negative genetics testing, though the genetics testing is not 100% sensitive. We can try again in the future if needed thanks

## 2024-05-20 ENCOUNTER — TELEPHONE (OUTPATIENT)
Dept: CARDIOLOGY | Facility: CLINIC | Age: 39
End: 2024-05-20
Payer: COMMERCIAL

## 2024-05-20 ENCOUNTER — MYC MEDICAL ADVICE (OUTPATIENT)
Dept: CARDIOLOGY | Facility: CLINIC | Age: 39
End: 2024-05-20
Payer: COMMERCIAL

## 2024-05-20 NOTE — TELEPHONE ENCOUNTER
My chart message from patient:  Eric Nicole [facility] Heart Team 2  Phone Number: [phone]     Kiran,    Thanks for the message. I am happy staying on Zetia. That doesn t mean I want to rule anything out in the future, but I m happy with the results thus far. I ve also been able to bring back working out regularly 3 to 5 days a week for the last few months. I do not see that changing for the worse again. Between positive physical changes and the medication, I am happy with the results. They are the best I ve had in my adult life.    [place] will be out of the [place] 15 days at the end of  going into July. I may run out of Zetia sometime during the trip. Is it possible to get a prescription for 15 extra tablets so I will be covered through our trip? If they answer is no because of insurance, I will just take one every other day during the trip if I was close to running out. I will start daily again once we return home.    Thank you kindly,  John      Reply:    Kiran, Mr. Nicole,    For vacations, you can request a special fill from your pharmacist to cover the dates you are gone. They file an exception request with your insurance.   The clinic doesn't have any input into that plan unless your refills are .    Thank you for the update  Team 2 R.N.s  [phone]

## 2024-08-02 ENCOUNTER — DOCUMENTATION ONLY (OUTPATIENT)
Dept: LAB | Facility: CLINIC | Age: 39
End: 2024-08-02
Payer: COMMERCIAL

## 2024-08-02 DIAGNOSIS — E78.49 FAMILIAL HYPERLIPIDEMIA: ICD-10-CM

## 2024-08-02 DIAGNOSIS — E78.5 HYPERLIPIDEMIA LDL GOAL <100: Primary | ICD-10-CM

## 2024-08-02 DIAGNOSIS — E78.01 FAMILIAL HYPERCHOLESTEROLEMIA: ICD-10-CM

## 2024-08-06 NOTE — PROGRESS NOTES
Per Dr. Leon's LOV 7/11/24:  Assessment and Plan/Recommendations:     In summary, patient is a pleasant 38-year-old male with a family history of his mother being diagnosed with familial hypercholesterolemia, patient has had severely elevated cholesterol levels since his teens, overall clinical presentation is consistent with familial hypercholesterolemia.  He denies symptoms concerning for angina or decompensated heart failure.     - We will start rosuvastatin 40 mg nightly  - Fasting lipids with direct LDL, ALT in 3 months  - If LDL does not decrease by at least 50% to target, or if patient does not tolerate statin therapy, recommend evolocumab therapy       Lipid profile has been closely monitored and last drawn in May of 2024 without order to recheck. Patient called to discuss and reports he was under the impression labs were needed again. Message routed to Dr. Leon to confirm.

## 2024-08-08 NOTE — PROGRESS NOTES
Vince Leon MD  You; Tran [facility] Heart Team 21 hour ago (7:30 AM)     Yes lipid profile, not on statin so ALT not necessary thanks     -------------------------------------------------    Patient called clinic to follow up if labs are needed prior to apt with Dr. Leon on Monday 8/12/24. Patient informed of Dr. Leon's above response and verbalizes understanding. Lab order entered and patient reminded of lab apt already scheduled tomorrow, 8/9/24.

## 2024-08-09 ENCOUNTER — LAB (OUTPATIENT)
Dept: LAB | Facility: CLINIC | Age: 39
End: 2024-08-09
Payer: COMMERCIAL

## 2024-08-09 DIAGNOSIS — E78.01 FAMILIAL HYPERCHOLESTEROLEMIA: ICD-10-CM

## 2024-08-09 DIAGNOSIS — E78.49 FAMILIAL HYPERLIPIDEMIA: ICD-10-CM

## 2024-08-09 DIAGNOSIS — E78.5 HYPERLIPIDEMIA LDL GOAL <100: ICD-10-CM

## 2024-08-09 LAB
CHOLEST SERPL-MCNC: 214 MG/DL
FASTING STATUS PATIENT QL REPORTED: YES
HDLC SERPL-MCNC: 72 MG/DL
LDLC SERPL CALC-MCNC: 133 MG/DL
NONHDLC SERPL-MCNC: 142 MG/DL
TRIGL SERPL-MCNC: 45 MG/DL

## 2024-08-09 PROCEDURE — 80061 LIPID PANEL: CPT

## 2024-08-09 PROCEDURE — 36415 COLL VENOUS BLD VENIPUNCTURE: CPT

## 2024-08-12 ENCOUNTER — OFFICE VISIT (OUTPATIENT)
Dept: CARDIOLOGY | Facility: CLINIC | Age: 39
End: 2024-08-12
Attending: INTERNAL MEDICINE
Payer: COMMERCIAL

## 2024-08-12 VITALS
WEIGHT: 192 LBS | HEART RATE: 84 BPM | OXYGEN SATURATION: 100 % | DIASTOLIC BLOOD PRESSURE: 75 MMHG | BODY MASS INDEX: 27.55 KG/M2 | SYSTOLIC BLOOD PRESSURE: 136 MMHG

## 2024-08-12 DIAGNOSIS — E78.01 FAMILIAL HYPERCHOLESTEROLEMIA: ICD-10-CM

## 2024-08-12 PROCEDURE — 99214 OFFICE O/P EST MOD 30 MIN: CPT | Performed by: INTERNAL MEDICINE

## 2024-08-12 NOTE — LETTER
8/12/2024    Arcadio Regan MD  [address] [facility] [address]    RE: John Marquezer       Dear Colleague,     I had the pleasure of seeing John Nicole in the [facility].      Cardiology Clinic Progress Note:    August 12, 2024   Patient Name: John Nicole  Patient MRN: [id number]     Consult indication: dyslipidemia     HPI:    I had the opportunity to see patient John Nicole in cardiology clinic for a follow up visit. Patient is followed by our colleague Arcadio Regan MD with Primary Care.     Patient is a pleasant 39-year-old male with a past medical history significant for dyslipidemia, family history of familial hypercholesterolemia (mother), hypertension, who presents for follow up.      Patient states that as a teenager he had fasting lipids done for screening prior to starting acne medication and his total cholesterol at that time was approximately 300.  Last cholesterol labs 5/23/2023 notable for total cholesterol 270, HDL 64, , LP(a) 115.  Patient was recently seen by our colleague Dr. Regan with Primary Care and was started on evolocumab, unfortunately this was denied by insurance.  Patient notes that his mother was recently diagnosed with familial hypercholesterolemia, and after having failed multiple statin medications, she is now on evolocumab with excellent results.    At her last visit, we started rosuvastatin 40 mg nightly, however this was not well-tolerated and so this was discontinued.  He has since been on ezetimibe 10 mg daily only.  He has made significant lifestyle changes, now exercising for most days of the week, engaging in resistance training and some aerobic exercise.  He has also been mindful of his diet.  He denies any chest pain, chest pressure, normal shortness of breath.  Lipids from 8/9/2024 notable for total cholesterol 214, HDL 72, , triglycerides 45.    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    Assessment and Plan/Recommendations:    In summary, patient is a pleasant 39-year-old male with a family history of his mother being diagnosed with familial hypercholesterolemia, patient has had severely elevated cholesterol levels since his teens, overall clinical presentation is consistent with familial hypercholesterolemia.  He denies symptoms concerning for angina or decompensated heart failure.     Patient did not tolerate rosuvastatin 40 mg nightly, has been on ezetimibe 10 mg daily.  In addition, patient has made significant lifestyle changes, including increased regular exercise, LDL profile has improved significantly.  Discussed option of a lower potency statin such as pravastatin, patient would like to hold off in favor of continued monitoring with ezetimibe and continued healthy lifestyle habits, which I feel is certainly reasonable, he did note that he is open to a trial of pravastatin if needed.  Patient will plan to have lipids done through his PCP team in May as he has done in the past, pending values, we may start pravastatin, I will plan to see him back in August, with repeat labs if we do start pravastatin in May.       Thank you for allowing our team to participate in the care of John Nicole.  Please do not hesitate to call or page me with any questions or concerns.    Sincerely,     Vince Leon MD, [facility]  Cardiology  Text Page   August 12, 2024    Voice recognition software utilized.     Total time spent on this encounter today: Greater than 30 minutes, providing care in this encounter including, but not limited to, reviewing prior medical records, laboratory data, imaging studies, diagnostic studies, procedure notes, formulating an assessment and plan, recommendations, discussion and counseling with patient face to face, dictation.    Past Medical History:     Past Medical History:   Diagnosis Date     Familial  hypercholesterolemia      Hypertension     See previous records        Past Surgical History:   Past Surgical History:   Procedure Laterality Date     NO HISTORY OF SURGERY         Medications (outpatient):  Current Outpatient Medications   Medication Sig Dispense Refill     ezetimibe (ZETIA) 10 MG tablet Take 1 tablet (10 mg) by mouth daily 90 tablet 4     lisinopril (ZESTRIL) 20 MG tablet Take 1 tablet (20 mg) by mouth daily 90 tablet 4     multivitamin, therapeutic with minerals (MULTI-VITAMIN) TABS tablet Take 1 tablet by mouth daily       psyllium (METAMUCIL/KONSYL) 58.6 % powder Take 18 g (1 Tablespoonful) by mouth daily         Allergies:  Allergies   Allergen Reactions     Crestor [Rosuvastatin] Muscle Pain (Myalgia)     Seasonal Allergies        Social History:   History   Drug Use No      History   Smoking Status     Never   Smokeless Tobacco     Never     Social History    Substance and Sexual Activity      Alcohol use: Yes        Comment: beer a few times a year       Family History:  Family History   Problem Relation Age of Onset     Diabetes Mother      Breast Cancer Mother 48     Hypertension Father 35     Sarcoidosis Father      Other - See Comments Sister         BRCA2 mutation     Coronary Artery Disease Maternal Grandmother      Coronary Artery Disease Maternal Grandfather      Diabetes Paternal Grandmother      Coronary Artery Disease Paternal Grandmother      No Known Problems Daughter        Review of Systems:   A complete review of systems was negative except as mentioned in the History of Present Illness.     Objective & Physical Exam:  /75   Pulse 84   Wt 87.1 kg (192 lb)   SpO2 100%   BMI 27.55 kg/m    Wt Readings from Last 2 Encounters:   08/12/24 87.1 kg (192 lb)   05/13/24 86.6 kg (191 lb)     Body mass index is 27.55 kg/m .   Body surface area is 2.07 meters squared.    Constitutional: appears stated age, in no apparent distress, appears to be well nourished  Pulmonary: clear  to auscultation bilaterally, no wheezes, no rales, no increased work of breathing  Cardiovascular: JVP normal, regular rate, regular rhythm, normal S1 and S2, no S3, S4, no murmur appreciated, no lower extremity edema  Gastrointestinal: no guarding, non-rigid   Neurologic: awake, alert, moves all extremities  Skin: no jaundice, warm on limited exam    Data reviewed:  Lab Results   Component Value Date    WBC 6.2 02/15/2024    WBC 6.2 05/03/2021    RBC 5.11 02/15/2024    RBC 5.03 05/03/2021    HGB 15.5 02/15/2024    HGB 15.5 05/03/2021    HCT 46.6 02/15/2024    HCT 45.0 05/03/2021    MCV 91 02/15/2024    MCV 90 05/03/2021    MCH 30.3 02/15/2024    MCH 30.8 05/03/2021    MCHC 33.3 02/15/2024    MCHC 34.4 05/03/2021    RDW 11.9 02/15/2024    RDW 11.7 05/03/2021     02/15/2024     05/03/2021     Sodium   Date Value Ref Range Status   02/15/2024 137 135 - 145 mmol/L Final     Comment:     Reference intervals for this test were updated on 09/26/2023 to more accurately reflect our healthy population. There may be differences in the flagging of prior results with similar values performed with this method. Interpretation of those prior results can be made in the context of the updated reference intervals.    05/03/2021 137 133 - 144 mmol/L Final     Potassium   Date Value Ref Range Status   02/15/2024 4.7 3.4 - 5.3 mmol/L Final   05/03/2021 4.4 3.4 - 5.3 mmol/L Final     Chloride   Date Value Ref Range Status   02/15/2024 99 98 - 107 mmol/L Final   05/03/2021 104 94 - 109 mmol/L Final     Carbon Dioxide   Date Value Ref Range Status   05/03/2021 30 20 - 32 mmol/L Final     Carbon Dioxide (CO2)   Date Value Ref Range Status   02/15/2024 27 22 - 29 mmol/L Final     Anion Gap   Date Value Ref Range Status   02/15/2024 11 7 - 15 mmol/L Final   05/03/2021 3 3 - 14 mmol/L Final     Glucose   Date Value Ref Range Status   02/15/2024 113 (H) 70 - 99 mg/dL Final   05/03/2021 102 (H) 70 - 99 mg/dL Final     Urea Nitrogen    Date Value Ref Range Status   02/15/2024 12.9 6.0 - 20.0 mg/dL Final   05/03/2021 13 7 - 30 mg/dL Final     Creatinine   Date Value Ref Range Status   02/15/2024 0.97 0.67 - 1.17 mg/dL Final   05/03/2021 0.98 0.66 - 1.25 mg/dL Final     GFR Estimate   Date Value Ref Range Status   02/15/2024 >90 >60 mL/min/1.73m2 Final   05/03/2021 >90 >60 mL/min/[1.73_m2] Final     Comment:     Non  GFR Calc  Starting 12/18/2018, serum creatinine based estimated GFR (eGFR) will be   calculated using the Chronic Kidney Disease Epidemiology Collaboration   (CKD-EPI) equation.       Calcium   Date Value Ref Range Status   02/15/2024 10.2 (H) 8.6 - 10.0 mg/dL Final   05/03/2021 9.4 8.5 - 10.1 mg/dL Final     Bilirubin Total   Date Value Ref Range Status   02/15/2024 0.7 <=1.2 mg/dL Final   05/03/2021 0.7 0.2 - 1.3 mg/dL Final     Alkaline Phosphatase   Date Value Ref Range Status   02/15/2024 65 40 - 150 U/L Final     Comment:     Reference intervals for this test were updated on 11/14/2023 to more accurately reflect our healthy population. There may be differences in the flagging of prior results with similar values performed with this method. Interpretation of those prior results can be made in the context of the updated reference intervals.   05/03/2021 54 40 - 150 U/L Final     ALT   Date Value Ref Range Status   05/13/2024 24 0 - 70 U/L Final   05/03/2021 38 0 - 70 U/L Final     AST   Date Value Ref Range Status   02/15/2024 21 0 - 45 U/L Final     Comment:     Reference intervals for this test were updated on 6/12/2023 to more accurately reflect our healthy population. There may be differences in the flagging of prior results with similar values performed with this method. Interpretation of those prior results can be made in the context of the updated reference intervals.   05/03/2021 22 0 - 45 U/L Final     Recent Labs   Lab Test 08/09/24  0743 05/13/24  0952   CHOL 214* 229*   HDL 72 73   * 147*   TRIG  45 47      Lab Results   Component Value Date    A1C 6.0 02/15/2024        No results found for this or any previous visit (from the past 4320 hour(s)).     Thank you for allowing me to participate in the care of your patient.      Sincerely,     Vince Leon MD     [facility]  cc:   Vince Leon MD  [address] [facility] [address]  [place] [address]

## 2024-08-12 NOTE — PROGRESS NOTES
Cardiology Clinic Progress Note:    August 12, 2024   Patient Name: John Nicole  Patient MRN: [id number]     Consult indication: dyslipidemia     HPI:    I had the opportunity to see patient John Nicole in cardiology clinic for a follow up visit. Patient is followed by our colleague Arcadio Regan MD with Primary Care.     Patient is a pleasant 39-year-old male with a past medical history significant for dyslipidemia, family history of familial hypercholesterolemia (mother), hypertension, who presents for follow up.      Patient states that as a teenager he had fasting lipids done for screening prior to starting acne medication and his total cholesterol at that time was approximately 300.  Last cholesterol labs 5/23/2023 notable for total cholesterol 270, HDL 64, , LP(a) 115.  Patient was recently seen by our colleague Dr. Regan with Primary Care and was started on evolocumab, unfortunately this was denied by insurance.  Patient notes that his mother was recently diagnosed with familial hypercholesterolemia, and after having failed multiple statin medications, she is now on evolocumab with excellent results.    At her last visit, we started rosuvastatin 40 mg nightly, however this was not well-tolerated and so this was discontinued.  He has since been on ezetimibe 10 mg daily only.  He has made significant lifestyle changes, now exercising for most days of the week, engaging in resistance training and some aerobic exercise.  He has also been mindful of his diet.  He denies any chest pain, chest pressure, normal shortness of breath.  Lipids from 8/9/2024 notable for total cholesterol 214, HDL 72, , triglycerides 45.    Patient does not smoke cigarettes, works a stressful job as a regional , with odd hours throughout the week.    Assessment and Plan/Recommendations:    In summary, patient is a pleasant 39-year-old male with a family history of his mother being diagnosed  with familial hypercholesterolemia, patient has had severely elevated cholesterol levels since his teens, overall clinical presentation is consistent with familial hypercholesterolemia.  He denies symptoms concerning for angina or decompensated heart failure.     Patient did not tolerate rosuvastatin 40 mg nightly, has been on ezetimibe 10 mg daily.  In addition, patient has made significant lifestyle changes, including increased regular exercise, LDL profile has improved significantly.  Discussed option of a lower potency statin such as pravastatin, patient would like to hold off in favor of continued monitoring with ezetimibe and continued healthy lifestyle habits, which I feel is certainly reasonable, he did note that he is open to a trial of pravastatin if needed.  Patient will plan to have lipids done through his PCP team in May as he has done in the past, pending values, we may start pravastatin, I will plan to see him back in August, with repeat labs if we do start pravastatin in May.       Thank you for allowing our team to participate in the care of John Nicole.  Please do not hesitate to call or page me with any questions or concerns.    Sincerely,     Vince Leon MD, [facility]  Cardiology  Text Page   August 12, 2024    Voice recognition software utilized.     Total time spent on this encounter today: Greater than 30 minutes, providing care in this encounter including, but not limited to, reviewing prior medical records, laboratory data, imaging studies, diagnostic studies, procedure notes, formulating an assessment and plan, recommendations, discussion and counseling with patient face to face, dictation.    Past Medical History:     Past Medical History:   Diagnosis Date    Familial hypercholesterolemia     Hypertension     See previous records        Past Surgical History:   Past Surgical History:   Procedure Laterality Date    NO HISTORY OF SURGERY         Medications  (outpatient):  Current Outpatient Medications   Medication Sig Dispense Refill    ezetimibe (ZETIA) 10 MG tablet Take 1 tablet (10 mg) by mouth daily 90 tablet 4    lisinopril (ZESTRIL) 20 MG tablet Take 1 tablet (20 mg) by mouth daily 90 tablet 4    multivitamin, therapeutic with minerals (MULTI-VITAMIN) TABS tablet Take 1 tablet by mouth daily      psyllium (METAMUCIL/KONSYL) 58.6 % powder Take 18 g (1 Tablespoonful) by mouth daily         Allergies:  Allergies   Allergen Reactions    Crestor [Rosuvastatin] Muscle Pain (Myalgia)    Seasonal Allergies        Social History:   History   Drug Use No      History   Smoking Status    Never   Smokeless Tobacco    Never     Social History    Substance and Sexual Activity      Alcohol use: Yes        Comment: beer a few times a year       Family History:  Family History   Problem Relation Age of Onset    Diabetes Mother     Breast Cancer Mother 48    Hypertension Father 35    Sarcoidosis Father     Other - See Comments Sister         BRCA2 mutation    Coronary Artery Disease Maternal Grandmother     Coronary Artery Disease Maternal Grandfather     Diabetes Paternal Grandmother     Coronary Artery Disease Paternal Grandmother     No Known Problems Daughter        Review of Systems:   A complete review of systems was negative except as mentioned in the History of Present Illness.     Objective & Physical Exam:  /75   Pulse 84   Wt 87.1 kg (192 lb)   SpO2 100%   BMI 27.55 kg/m    Wt Readings from Last 2 Encounters:   08/12/24 87.1 kg (192 lb)   05/13/24 86.6 kg (191 lb)     Body mass index is 27.55 kg/m .   Body surface area is 2.07 meters squared.    Constitutional: appears stated age, in no apparent distress, appears to be well nourished  Pulmonary: clear to auscultation bilaterally, no wheezes, no rales, no increased work of breathing  Cardiovascular: JVP normal, regular rate, regular rhythm, normal S1 and S2, no S3, S4, no murmur appreciated, no lower extremity  edema  Gastrointestinal: no guarding, non-rigid   Neurologic: awake, alert, moves all extremities  Skin: no jaundice, warm on limited exam    Data reviewed:  Lab Results   Component Value Date    WBC 6.2 02/15/2024    WBC 6.2 05/03/2021    RBC 5.11 02/15/2024    RBC 5.03 05/03/2021    HGB 15.5 02/15/2024    HGB 15.5 05/03/2021    HCT 46.6 02/15/2024    HCT 45.0 05/03/2021    MCV 91 02/15/2024    MCV 90 05/03/2021    MCH 30.3 02/15/2024    MCH 30.8 05/03/2021    MCHC 33.3 02/15/2024    MCHC 34.4 05/03/2021    RDW 11.9 02/15/2024    RDW 11.7 05/03/2021     02/15/2024     05/03/2021     Sodium   Date Value Ref Range Status   02/15/2024 137 135 - 145 mmol/L Final     Comment:     Reference intervals for this test were updated on 09/26/2023 to more accurately reflect our healthy population. There may be differences in the flagging of prior results with similar values performed with this method. Interpretation of those prior results can be made in the context of the updated reference intervals.    05/03/2021 137 133 - 144 mmol/L Final     Potassium   Date Value Ref Range Status   02/15/2024 4.7 3.4 - 5.3 mmol/L Final   05/03/2021 4.4 3.4 - 5.3 mmol/L Final     Chloride   Date Value Ref Range Status   02/15/2024 99 98 - 107 mmol/L Final   05/03/2021 104 94 - 109 mmol/L Final     Carbon Dioxide   Date Value Ref Range Status   05/03/2021 30 20 - 32 mmol/L Final     Carbon Dioxide (CO2)   Date Value Ref Range Status   02/15/2024 27 22 - 29 mmol/L Final     Anion Gap   Date Value Ref Range Status   02/15/2024 11 7 - 15 mmol/L Final   05/03/2021 3 3 - 14 mmol/L Final     Glucose   Date Value Ref Range Status   02/15/2024 113 (H) 70 - 99 mg/dL Final   05/03/2021 102 (H) 70 - 99 mg/dL Final     Urea Nitrogen   Date Value Ref Range Status   02/15/2024 12.9 6.0 - 20.0 mg/dL Final   05/03/2021 13 7 - 30 mg/dL Final     Creatinine   Date Value Ref Range Status   02/15/2024 0.97 0.67 - 1.17 mg/dL Final   05/03/2021 0.98  0.66 - 1.25 mg/dL Final     GFR Estimate   Date Value Ref Range Status   02/15/2024 >90 >60 mL/min/1.73m2 Final   05/03/2021 >90 >60 mL/min/[1.73_m2] Final     Comment:     Non  GFR Calc  Starting 12/18/2018, serum creatinine based estimated GFR (eGFR) will be   calculated using the Chronic Kidney Disease Epidemiology Collaboration   (CKD-EPI) equation.       Calcium   Date Value Ref Range Status   02/15/2024 10.2 (H) 8.6 - 10.0 mg/dL Final   05/03/2021 9.4 8.5 - 10.1 mg/dL Final     Bilirubin Total   Date Value Ref Range Status   02/15/2024 0.7 <=1.2 mg/dL Final   05/03/2021 0.7 0.2 - 1.3 mg/dL Final     Alkaline Phosphatase   Date Value Ref Range Status   02/15/2024 65 40 - 150 U/L Final     Comment:     Reference intervals for this test were updated on 11/14/2023 to more accurately reflect our healthy population. There may be differences in the flagging of prior results with similar values performed with this method. Interpretation of those prior results can be made in the context of the updated reference intervals.   05/03/2021 54 40 - 150 U/L Final     ALT   Date Value Ref Range Status   05/13/2024 24 0 - 70 U/L Final   05/03/2021 38 0 - 70 U/L Final     AST   Date Value Ref Range Status   02/15/2024 21 0 - 45 U/L Final     Comment:     Reference intervals for this test were updated on 6/12/2023 to more accurately reflect our healthy population. There may be differences in the flagging of prior results with similar values performed with this method. Interpretation of those prior results can be made in the context of the updated reference intervals.   05/03/2021 22 0 - 45 U/L Final     Recent Labs   Lab Test 08/09/24  0743 05/13/24  0952   CHOL 214* 229*   HDL 72 73   * 147*   TRIG 45 47      Lab Results   Component Value Date    A1C 6.0 02/15/2024        No results found for this or any previous visit (from the past 4320 hour(s)).

## 2024-08-12 NOTE — PATIENT INSTRUCTIONS
August 12, 2024    Thank you for allowing our Cardiology team to participate in your care.     Please note the following changes to your heart treatment plan:     Medication changes:   - none    Tests to be done:  - lipids with Dr. Regan in May, pending results, may start low dose pravastatin with repeat labs in August    Follow up:  - Follow up in 1 year, or sooner as needed.      For scheduling, please call [phone].      Please contact our team through App[employer]app or our Nurse Team Voicemail service [phone], or the General Clinic [phone] for any questions or concerns.     If you are having a medical emergency, please call [phone].     Sincerely,    Vince Leon MD, FAC  Cardiology    [facility] - [facility] - [facility] - [place]

## 2025-05-08 SDOH — HEALTH STABILITY: PHYSICAL HEALTH: ON AVERAGE, HOW MANY MINUTES DO YOU ENGAGE IN EXERCISE AT THIS LEVEL?: 70 MIN

## 2025-05-08 SDOH — HEALTH STABILITY: PHYSICAL HEALTH: ON AVERAGE, HOW MANY DAYS PER WEEK DO YOU ENGAGE IN MODERATE TO STRENUOUS EXERCISE (LIKE A BRISK WALK)?: 5 DAYS

## 2025-05-08 ASSESSMENT — SOCIAL DETERMINANTS OF HEALTH (SDOH): HOW OFTEN DO YOU GET TOGETHER WITH FRIENDS OR RELATIVES?: PATIENT DECLINED

## 2025-05-13 ENCOUNTER — RESULTS FOLLOW-UP (OUTPATIENT)
Dept: FAMILY MEDICINE | Facility: CLINIC | Age: 40
End: 2025-05-13

## 2025-05-13 ENCOUNTER — OFFICE VISIT (OUTPATIENT)
Dept: FAMILY MEDICINE | Facility: CLINIC | Age: 40
End: 2025-05-13
Attending: FAMILY MEDICINE
Payer: COMMERCIAL

## 2025-05-13 VITALS
DIASTOLIC BLOOD PRESSURE: 80 MMHG | SYSTOLIC BLOOD PRESSURE: 120 MMHG | HEIGHT: 70 IN | BODY MASS INDEX: 27.49 KG/M2 | TEMPERATURE: 97.8 F | WEIGHT: 192 LBS | HEART RATE: 86 BPM | RESPIRATION RATE: 18 BRPM | OXYGEN SATURATION: 100 %

## 2025-05-13 DIAGNOSIS — Z13.0 SCREENING, DEFICIENCY ANEMIA, IRON: ICD-10-CM

## 2025-05-13 DIAGNOSIS — I10 HYPERTENSION GOAL BP (BLOOD PRESSURE) < 130/80: ICD-10-CM

## 2025-05-13 DIAGNOSIS — Z00.00 ROUTINE GENERAL MEDICAL EXAMINATION AT A HEALTH CARE FACILITY: Primary | ICD-10-CM

## 2025-05-13 DIAGNOSIS — E78.01 FAMILIAL HYPERCHOLESTEROLEMIA: ICD-10-CM

## 2025-05-13 DIAGNOSIS — E78.5 HYPERLIPIDEMIA LDL GOAL <100: ICD-10-CM

## 2025-05-13 DIAGNOSIS — Z13.1 SCREENING FOR DIABETES MELLITUS: ICD-10-CM

## 2025-05-13 LAB
ALBUMIN SERPL BCG-MCNC: 4.8 G/DL (ref 3.5–5.2)
ALP SERPL-CCNC: 62 U/L (ref 40–150)
ALT SERPL W P-5'-P-CCNC: 26 U/L (ref 0–70)
ANION GAP SERPL CALCULATED.3IONS-SCNC: 14 MMOL/L (ref 7–15)
AST SERPL W P-5'-P-CCNC: 28 U/L (ref 0–45)
BILIRUB SERPL-MCNC: 0.7 MG/DL
BUN SERPL-MCNC: 11.6 MG/DL (ref 6–20)
CALCIUM SERPL-MCNC: 10.2 MG/DL (ref 8.8–10.4)
CHLORIDE SERPL-SCNC: 97 MMOL/L (ref 98–107)
CHOLEST SERPL-MCNC: 229 MG/DL
CREAT SERPL-MCNC: 1.14 MG/DL (ref 0.67–1.17)
CREAT UR-MCNC: 20.7 MG/DL
EGFRCR SERPLBLD CKD-EPI 2021: 83 ML/MIN/1.73M2
EST. AVERAGE GLUCOSE BLD GHB EST-MCNC: 117 MG/DL
FASTING STATUS PATIENT QL REPORTED: YES
FASTING STATUS PATIENT QL REPORTED: YES
GLUCOSE SERPL-MCNC: 110 MG/DL (ref 70–99)
HBA1C MFR BLD: 5.7 % (ref 0–5.6)
HCO3 SERPL-SCNC: 25 MMOL/L (ref 22–29)
HDLC SERPL-MCNC: 76 MG/DL
LDLC SERPL CALC-MCNC: 144 MG/DL
MICROALBUMIN UR-MCNC: <12 MG/L
MICROALBUMIN/CREAT UR: NORMAL MG/G{CREAT}
NONHDLC SERPL-MCNC: 153 MG/DL
POTASSIUM SERPL-SCNC: 4.1 MMOL/L (ref 3.4–5.3)
PROT SERPL-MCNC: 7.5 G/DL (ref 6.4–8.3)
SODIUM SERPL-SCNC: 136 MMOL/L (ref 135–145)
TRIGL SERPL-MCNC: 46 MG/DL

## 2025-05-13 PROCEDURE — G2211 COMPLEX E/M VISIT ADD ON: HCPCS | Performed by: FAMILY MEDICINE

## 2025-05-13 PROCEDURE — 99214 OFFICE O/P EST MOD 30 MIN: CPT | Mod: 25 | Performed by: FAMILY MEDICINE

## 2025-05-13 PROCEDURE — 82043 UR ALBUMIN QUANTITATIVE: CPT | Performed by: FAMILY MEDICINE

## 2025-05-13 PROCEDURE — 83036 HEMOGLOBIN GLYCOSYLATED A1C: CPT | Performed by: FAMILY MEDICINE

## 2025-05-13 PROCEDURE — 3074F SYST BP LT 130 MM HG: CPT | Performed by: FAMILY MEDICINE

## 2025-05-13 PROCEDURE — 80053 COMPREHEN METABOLIC PANEL: CPT | Performed by: FAMILY MEDICINE

## 2025-05-13 PROCEDURE — 99396 PREV VISIT EST AGE 40-64: CPT | Performed by: FAMILY MEDICINE

## 2025-05-13 PROCEDURE — 80061 LIPID PANEL: CPT | Performed by: FAMILY MEDICINE

## 2025-05-13 PROCEDURE — 36415 COLL VENOUS BLD VENIPUNCTURE: CPT | Performed by: FAMILY MEDICINE

## 2025-05-13 PROCEDURE — 3079F DIAST BP 80-89 MM HG: CPT | Performed by: FAMILY MEDICINE

## 2025-05-13 PROCEDURE — 82570 ASSAY OF URINE CREATININE: CPT | Performed by: FAMILY MEDICINE

## 2025-05-13 RX ORDER — EZETIMIBE 10 MG/1
10 TABLET ORAL DAILY
Qty: 90 TABLET | Refills: 4 | Status: SHIPPED | OUTPATIENT
Start: 2025-05-13

## 2025-05-13 RX ORDER — LISINOPRIL 20 MG/1
20 TABLET ORAL DAILY
Qty: 90 TABLET | Refills: 4 | Status: SHIPPED | OUTPATIENT
Start: 2025-05-13

## 2025-05-13 NOTE — PROGRESS NOTES
"Preventive Care Visit  [facility] PRIOR Kevin  Arcadio Regan MD, Family Medicine  May 13, 2025        Assessment & Plan     Routine general medical examination at a health care facility      Hypertension goal BP (blood pressure) < 130/80    Controlled - continue medication.   - COMPREHENSIVE METABOLIC PANEL  - Albumin Random Urine Quantitative with Creat Ratio  - lisinopril (ZESTRIL) 20 MG tablet  Dispense: 90 tablet; Refill: 4  - Albumin Random Urine Quantitative with Creat Ratio  - COMPREHENSIVE METABOLIC PANEL        Hyperlipidemia LDL goal <100  Familial hypercholesterolemia  - Hyperlipidemia being managed with Zetia with LDL levels improving.  - Continue current treatment and follow-up with cardiologist  Controlled - continue medication.   - Lipid panel reflex to direct LDL Non-fasting  - ezetimibe (ZETIA) 10 MG tablet  Dispense: 90 tablet; Refill: 4  - Lipid panel reflex to direct LDL Non-fasting    Screening, deficiency anemia, iron  - CBC with platelets    Screening for diabetes mellitus  - Hemoglobin A1c  - Hemoglobin A1c       Consent was obtained from the patient to use an AI documentation tool in the creation of this note.      BMI  Estimated body mass index is 27.55 kg/m  as calculated from the following:    Height as of this encounter: 1.778 m (5' 10\").    Weight as of this encounter: 87.1 kg (192 lb).   Weight management plan: Discussed healthy diet and exercise guidelines      Counseling  Appropriate preventive services were addressed with this patient via screening, questionnaire, or discussion as appropriate for fall prevention, nutrition, physical activity, social engagement, weight loss and cognition.  Checklist reviewing preventive services available has been given to the patient.  Reviewed patient's diet, addressing concerns and/or questions.     Patient has been advised of split billing requirements and indicates understanding: Yes    Return in about 1 year (around 5/13/2026) " for wellness exam with Bowen Regan MD.    Follow-up Visit   Expected date:  May 13, 2026 (Approximate)      Follow Up Appointment Details:     Follow-up with whom?: PCP    Follow-Up for what?: Adult Preventive    How?: In Person                     Bowen Regan MD     [address] [address]  [facility]     Office: [phone]           Valencia Reyes is a 39 year old, presenting for the following:  Physical        5/13/2025     7:45 AM   Additional Questions   Roomed by Nguyen CADENA   Consent was obtained from the patient to use an AI documentation tool in the creation of this note.           HPI  He has a history of high cholesterol, which has been gradually decreasing with treatment. He has been under the care of Dr. Leon, a cardiologist, and has been on Repatha for a couple of years. Despite improvements, his cholesterol levels remain elevated, with a recent measurement of 133. He is scheduled for a follow-up with Dr. Leon in June to monitor his condition.    Family History Concerns  He is aware of a family history of blood sugar issues, as his mother has occasionally required medication for it. He is concerned about potentially becoming borderline diabetic in the future, although he has not experienced any significant issues himself. He is proactive in managing his health and is open to screening for diabetes.    Hypertension Follow-up    Do you check your blood pressure regularly outside of the clinic? Yes   Are you following a low salt diet? Yes  Are your blood pressures ever more than 140 on the top number (systolic) OR more   than 90 on the bottom number (diastolic), for example 140/90? No    BP Readings from Last 2 Encounters:   05/13/25 120/80   08/12/24 136/75       Advance Care Planning    Discussed advance care planning with patient; informed AVS has link to [employer].        5/8/2025   General Health   How would you rate your overall  physical health? Good   Feel stress (tense, anxious, or unable to sleep) Not at all         5/8/2025   Nutrition   Three or more servings of calcium each day? Yes   Diet: Low salt    Low fat/cholesterol    Carbohydrate counting    Vegetarian/vegan    Gluten-free/reduced   How many servings of fruit and vegetables per day? (!) 2-3   How many sweetened beverages each day? 0-1       Multiple values from one day are sorted in reverse-chronological order         5/8/2025   Exercise   Days per week of moderate/strenous exercise 5 days   Average minutes spent exercising at this level 70 min         5/8/2025   Social Factors   Frequency of gathering with friends or relatives Patient declined   Worry food won't last until get money to buy more No   Food not last or not have enough money for food? No   Do you have housing? (Housing is defined as stable permanent housing and does not include staying outside in a car, in a tent, in an abandoned building, in an overnight shelter, or couch-surfing.) Yes   Are you worried about losing your housing? No   Lack of transportation? No   Unable to get utilities (heat,electricity)? No         5/8/2025   Dental   Dentist two times every year? Yes         Today's PHQ-2 Score:       5/13/2025     7:19 AM   PHQ-2 ( 1999 [employer])   Q1: Little interest or pleasure in doing things 0   Q2: Feeling down, depressed or hopeless 0   PHQ-2 Score 0    Q1: Little interest or pleasure in doing things Not at all   Q2: Feeling down, depressed or hopeless Not at all   PHQ-2 Score 0       Patient-reported           5/8/2025   Substance Use   Alcohol more than 3/day or more than 7/wk Not Applicable   Do you use any other substances recreationally? No     Social History     Tobacco Use    Smoking status: Never    Smokeless tobacco: Never   Vaping Use    Vaping status: Never Used   Substance Use Topics    Alcohol use: Yes     Comment: One beer every few months    Drug use: No           5/8/2025   STI Screening  "  New sexual partner(s) since last STI/HIV test? No   ASCVD Risk   The 10-year ASCVD risk score (Lien PARKS, et al., 2019) is: 0.9%    Values used to calculate the score:      Age: 39 years      Sex: Male      Is Non- : No      Diabetic: No      Tobacco smoker: No      Systolic Blood Pressure: 120 mmHg      Is BP treated: Yes      HDL Cholesterol: 72 mg/dL      Total Cholesterol: 214 mg/dL        5/8/2025   Contraception/Family Planning   Questions about contraception or family planning No        Reviewed and updated as needed this visit by Provider   Tobacco  Allergies  Meds  Problems  Med Hx  Surg Hx  Fam Hx             Objective    Exam  /80   Pulse 86   Temp 97.8  F (36.6  C) (Tympanic)   Resp 18   Ht 1.778 m (5' 10\")   Wt 87.1 kg (192 lb)   SpO2 100%   BMI 27.55 kg/m     Estimated body mass index is 27.55 kg/m  as calculated from the following:    Height as of this encounter: 1.778 m (5' 10\").    Weight as of this encounter: 87.1 kg (192 lb).    Physical Exam  GENERAL: healthy, alert and no distress  EYES: Eyes grossly normal to inspection, PERRL and conjunctivae and sclerae normal  HENT: ear canals and TM's normal, nose and mouth without ulcers or lesions  NECK: no adenopathy, no asymmetry, masses, or scars and thyroid normal to palpation  RESP: lungs clear to auscultation - no rales, rhonchi or wheezes  BREAST: normal without masses, tenderness or nipple discharge and no palpable axillary masses or adenopathy  CV: regular rate and rhythm, normal S1 S2, no S3 or S4, no murmur, click or rub, no peripheral edema and peripheral pulses strong  ABDOMEN: soft, nontender, no hepatosplenomegaly, no masses and bowel sounds normal   (male): normal male genitalia without lesions or urethral discharge, no hernia  MS: no gross musculoskeletal defects noted, no edema  SKIN: no suspicious lesions or rashes  NEURO: Normal strength and tone, mentation intact and speech " normal  PSYCH: mentation appears normal, affect normal/bright  LYMPH: no cervical, supraclavicular, axillary, or inguinal adenopathy   RECTAL: declined exam    The longitudinal plan of care for the diagnosis(es)/condition(s) as documented were addressed during this visit. Due to the added complexity in care, I will continue to support Eric in the subsequent management and with ongoing continuity of care.      Signed Electronically by: Arcadio Regan MD

## 2025-05-13 NOTE — PATIENT INSTRUCTIONS
Patient Education   Preventive Care Advice   This is general advice given by our system to help you stay healthy. However, your care team may have specific advice just for you. Please talk to your care team about your preventive care needs.  Nutrition  Eat 5 or more servings of fruits and vegetables each day.  Try wheat bread, brown rice and whole grain pasta (instead of white bread, rice, and pasta).  Get enough calcium and vitamin D. Check the label on foods and aim for 100% of the RDA (recommended daily allowance).  Lifestyle  Exercise at least 150 minutes each week  (30 minutes a day, 5 days a week).  Do muscle strengthening activities 2 days a week. These help control your weight and prevent disease.  No smoking.  Wear sunscreen to prevent skin cancer.  Have a dental exam and cleaning every 6 months.  Yearly exams  See your health care team every year to talk about:  Any changes in your health.  Any medicines your care team has prescribed.  Preventive care, family planning, and ways to prevent chronic diseases.  Shots (vaccines)   HPV shots (up to age 26), if you've never had them before.  Hepatitis B shots (up to age 59), if you've never had them before.  COVID-19 shot: Get this shot when it's due.  Flu shot: Get a flu shot every year.  Tetanus shot: Get a tetanus shot every 10 years.  Pneumococcal, hepatitis A, and RSV shots: Ask your care team if you need these based on your risk.  Shingles shot (for age 50 and up)  General health tests  Diabetes screening:  Starting at age 35, Get screened for diabetes at least every 3 years.  If you are younger than age 35, ask your care team if you should be screened for diabetes.  Cholesterol test: At age 39, start having a cholesterol test every 5 years, or more often if advised.  Bone density scan (DEXA): At age 50, ask your care team if you should have this scan for osteoporosis (brittle bones).  Hepatitis C: Get tested at least once in your life.  STIs (sexually  transmitted infections)  Before age 24: Ask your care team if you should be screened for STIs.  After age 24: Get screened for STIs if you're at risk. You are at risk for STIs (including HIV) if:  You are sexually active with more than one person.  You don't use condoms every time.  You or a partner was diagnosed with a sexually transmitted infection.  If you are at risk for HIV, ask about PrEP medicine to prevent HIV.  Get tested for HIV at least once in your life, whether you are at risk for HIV or not.  Cancer screening tests  Cervical cancer screening: If you have a cervix, begin getting regular cervical cancer screening tests starting at age 21.  Breast cancer scan (mammogram): If you've ever had breasts, begin having regular mammograms starting at age 40. This is a scan to check for breast cancer.  Colon cancer screening: It is important to start screening for colon cancer at age 45.  Have a colonoscopy test every 10 years (or more often if you're at risk) Or, ask your provider about stool tests like a FIT test every year or Cologuard test every 3 years.  To learn more about your testing options, visit:   .  For help making a decision, visit:   https://bit.ly/oy08397.  Prostate cancer screening test: If you have a prostate, ask your care team if a prostate cancer screening test (PSA) at age 55 is right for you.  Lung cancer screening: If you are a current or former smoker ages 50 to 80, ask your care team if ongoing lung cancer screenings are right for you.  For informational purposes only. Not to replace the advice of your health care provider. Copyright   2023 [employer]. All rights reserved. Clinically reviewed by the [facility]itions Program. [employer] [id number] - REV 01/24.

## 2025-05-14 NOTE — RESULT ENCOUNTER NOTE
Dear Eric,    Here is a summary of your recent test results:  -Cholesterol levels are slightly more elevated than previously but your HDL (good cholesterol) is higher and that is good news.  ADVISE: continuing your medication, a regular exercise program with at least 150 minutes of aerobic exercise per week, and eating a low saturated fat/low carbohydrate diet.  Also, you should recheck this fasting cholesterol panel in 12 months.  -Liver and gallbladder tests (ALT,AST, Alk phos,bilirubin) are normal.  -Kidney function (GFR) is normal.  -Sodium is normal.  -Potassium is normal.  -Calcium is normal.  -Glucose is slight elevated and may be a sign of early diabetes (prediabetes). ADVISE:: eating a low carbohydrate diet, exercising, trying to lose weight (if necessary) and rechecking your glucose level in 12 months.  -A1C (diabetic test) is normal and indicates that your blood sugar has been in a normal range the last 3 months.  -Microalbumin (urine protein) test is normal.  ADVISE: rechecking this annually.    For additional lab test information, www.testing.com is a very good reference.           Thank you very much for trusting me and [facility].     Have a peaceful day.    Healthy regards,  Bowen Regan MD

## 2025-06-09 ENCOUNTER — OFFICE VISIT (OUTPATIENT)
Dept: CARDIOLOGY | Facility: CLINIC | Age: 40
End: 2025-06-09
Payer: COMMERCIAL

## 2025-06-09 VITALS
HEIGHT: 70 IN | BODY MASS INDEX: 28.29 KG/M2 | DIASTOLIC BLOOD PRESSURE: 86 MMHG | WEIGHT: 197.6 LBS | OXYGEN SATURATION: 98 % | SYSTOLIC BLOOD PRESSURE: 135 MMHG | HEART RATE: 85 BPM

## 2025-06-09 DIAGNOSIS — E78.41 ELEVATED LIPOPROTEIN(A): ICD-10-CM

## 2025-06-09 DIAGNOSIS — E78.5 HYPERLIPIDEMIA LDL GOAL <100: Primary | ICD-10-CM

## 2025-06-09 PROCEDURE — 3075F SYST BP GE 130 - 139MM HG: CPT | Performed by: INTERNAL MEDICINE

## 2025-06-09 PROCEDURE — 3079F DIAST BP 80-89 MM HG: CPT | Performed by: INTERNAL MEDICINE

## 2025-06-09 PROCEDURE — 99213 OFFICE O/P EST LOW 20 MIN: CPT | Performed by: INTERNAL MEDICINE

## 2025-06-09 RX ORDER — PRAVASTATIN SODIUM 20 MG
20 TABLET ORAL AT BEDTIME
Qty: 90 TABLET | Refills: 3 | Status: SHIPPED | OUTPATIENT
Start: 2025-06-09

## 2025-06-09 NOTE — LETTER
6/9/2025    Arcadio Regan MD  [address] [facility] [address]    RE: John Marquezer       Dear Colleague,     I had the pleasure of seeing John Nicole in the [facility].      Cardiology Clinic Progress Note:    June 9, 2025   Patient Name: John Nicole  Patient MRN: [id number]     Consult indication: HL    HPI:    I had the opportunity to see patient John Nicole in cardiology clinic for a follow up visit. Patient is followed by our colleague Arcadio Regan MD with Primary Care.     Patient is a pleasant 40-year-old male with a past medical history significant for dyslipidemia, family history of familial hypercholesterolemia (mother), hypertension, who presents for follow up.      Patient states that as a teenager he had fasting lipids done for screening prior to starting acne medication and his total cholesterol at that time was approximately 300.  Last cholesterol labs 5/23/2023 notable for total cholesterol 270, HDL 64, , LP(a) 115.  Patient was recently seen by our colleague Dr. Regan with Primary Care and was started on evolocumab, unfortunately this was denied by insurance.  Patient notes that his mother was recently diagnosed with familial hypercholesterolemia, and after having failed multiple statin medications, she is now on evolocumab with excellent results.     Previously started rosuvastatin 40 mg nightly, however this was not well-tolerated and so this was discontinued.  He has since been on ezetimibe 10 mg daily only.  Patient continues to exercise regularly, engages in aerobic exercise, resistance training, has put on muscle mass.  Overall feels great.  Specifically no chest pain, chest pressure, abnormal shortness of breath.    Reviewed lipids from 5/2025, total cholesterol 229, HDL 76, , triglycerides 46.    Patient does not smoke cigarettes, works a stressful job as a regional , with odd hours throughout the week.    BP today  in clinic 135/86 mmHg, however at home 120/70 mmHg range.    Assessment and Plan/Recommendations:    In summary, patient is a pleasant 39-year-old male with a family history of his mother being diagnosed with familial hypercholesterolemia, patient has had severely elevated cholesterol levels since his teens, overall clinical presentation is consistent with familial hypercholesterolemia.  He denies symptoms concerning for angina or decompensated heart failure.     Patient did not tolerate rosuvastatin 40 mg nightly, has been on ezetimibe 10 mg daily.  In addition, patient has made significant lifestyle changes, including increased regular exercise, LDL profile has improved significantly.  However given his strong family history of dyslipidemia, and elevated LP(a) of 115, would favor being more aggressive with lipid management.  Will trial low-dose pravastatin, if not well-tolerated, then will attempt to start evolocumab again for dyslipidemia complicated by statin intolerance.  Follow-up in 1 year, or sooner as needed.      Thank you for allowing our team to participate in the care of John Nicole.  Please do not hesitate to call or page me with any questions or concerns.    Sincerely,     Vince Leon MD, [facility]  Cardiology  Text Page   June 9, 2025    Voice recognition software utilized.       Past Medical History:     Past Medical History:   Diagnosis Date     Familial hypercholesterolemia      Hypertension     See previous records        Past Surgical History:   Past Surgical History:   Procedure Laterality Date     NO HISTORY OF SURGERY         Medications (outpatient):  Current Outpatient Medications   Medication Sig Dispense Refill     ezetimibe (ZETIA) 10 MG tablet Take 1 tablet (10 mg) by mouth daily. 90 tablet 4     lisinopril (ZESTRIL) 20 MG tablet Take 1 tablet (20 mg) by mouth daily. 90 tablet 4     multivitamin, therapeutic with minerals (MULTI-VITAMIN) TABS tablet Take 1 tablet by mouth  "daily       pravastatin (PRAVACHOL) 20 MG tablet Take 1 tablet (20 mg) by mouth at bedtime. 90 tablet 3       Allergies:  Allergies   Allergen Reactions     Crestor [Rosuvastatin] Muscle Pain (Myalgia)     Seasonal Allergies        Social History:   History   Drug Use No      History   Smoking Status     Never   Smokeless Tobacco     Never     Social History    Substance and Sexual Activity      Alcohol use: Yes        Comment: One beer every few months       Family History:  Family History   Problem Relation Age of Onset     Diabetes Mother         Pre-diabetic. Has been on metformin for a long time and it is controlled.     Breast Cancer Mother 48     Hypertension Father 35     Sarcoidosis Father      Diabetes Father      Other - See Comments Sister         BRCA2 mutation     Coronary Artery Disease Maternal Grandmother      Coronary Artery Disease Maternal Grandfather      Diabetes Paternal Grandmother      Coronary Artery Disease Paternal Grandmother      No Known Problems Daughter      Cerebrovascular Disease No family hx of      Colon Cancer No family hx of      Prostate Cancer No family hx of        Review of Systems:   A complete review of systems was negative except as mentioned in the History of Present Illness.     Objective & Physical Exam:  /86   Pulse 85   Ht 1.778 m (5' 10\")   Wt 89.6 kg (197 lb 9.6 oz)   SpO2 98%   BMI 28.35 kg/m    Wt Readings from Last 2 Encounters:   06/09/25 89.6 kg (197 lb 9.6 oz)   05/13/25 87.1 kg (192 lb)     Body mass index is 28.35 kg/m .   Body surface area is 2.1 meters squared.    Constitutional: appears stated age, in no apparent distress, appears to be well nourished  Pulmonary: clear to auscultation bilaterally, no wheezes, no rales, no increased work of breathing  Cardiovascular: JVP normal, regular rate, regular rhythm, no murmur appreciated, no lower extremity edema  Neurologic: awake, alert, moves all extremities  Skin: no jaundice, warm on limited " exam    Data reviewed:  Lab Results   Component Value Date    WBC 6.2 02/15/2024    WBC 6.2 05/03/2021    RBC 5.11 02/15/2024    RBC 5.03 05/03/2021    HGB 15.5 02/15/2024    HGB 15.5 05/03/2021    HCT 46.6 02/15/2024    HCT 45.0 05/03/2021    MCV 91 02/15/2024    MCV 90 05/03/2021    MCH 30.3 02/15/2024    MCH 30.8 05/03/2021    MCHC 33.3 02/15/2024    MCHC 34.4 05/03/2021    RDW 11.9 02/15/2024    RDW 11.7 05/03/2021     02/15/2024     05/03/2021     Sodium   Date Value Ref Range Status   05/13/2025 136 135 - 145 mmol/L Final   05/03/2021 137 133 - 144 mmol/L Final     Potassium   Date Value Ref Range Status   05/13/2025 4.1 3.4 - 5.3 mmol/L Final   05/03/2021 4.4 3.4 - 5.3 mmol/L Final     Chloride   Date Value Ref Range Status   05/13/2025 97 (L) 98 - 107 mmol/L Final   05/03/2021 104 94 - 109 mmol/L Final     Carbon Dioxide   Date Value Ref Range Status   05/03/2021 30 20 - 32 mmol/L Final     Carbon Dioxide (CO2)   Date Value Ref Range Status   05/13/2025 25 22 - 29 mmol/L Final     Anion Gap   Date Value Ref Range Status   05/13/2025 14 7 - 15 mmol/L Final   05/03/2021 3 3 - 14 mmol/L Final     Glucose   Date Value Ref Range Status   05/13/2025 110 (H) 70 - 99 mg/dL Final   05/03/2021 102 (H) 70 - 99 mg/dL Final     Urea Nitrogen   Date Value Ref Range Status   05/13/2025 11.6 6.0 - 20.0 mg/dL Final   05/03/2021 13 7 - 30 mg/dL Final     Creatinine   Date Value Ref Range Status   05/13/2025 1.14 0.67 - 1.17 mg/dL Final   05/03/2021 0.98 0.66 - 1.25 mg/dL Final     GFR Estimate   Date Value Ref Range Status   05/13/2025 83 >60 mL/min/1.73m2 Final     Comment:     eGFR calculated using 2021 CKD-EPI equation.   05/03/2021 >90 >60 mL/min/[1.73_m2] Final     Comment:     Non  GFR Calc  Starting 12/18/2018, serum creatinine based estimated GFR (eGFR) will be   calculated using the Chronic Kidney Disease Epidemiology Collaboration   (CKD-EPI) equation.       Calcium   Date Value Ref  Range Status   05/13/2025 10.2 8.8 - 10.4 mg/dL Final   05/03/2021 9.4 8.5 - 10.1 mg/dL Final     Bilirubin Total   Date Value Ref Range Status   05/13/2025 0.7 <=1.2 mg/dL Final   05/03/2021 0.7 0.2 - 1.3 mg/dL Final     Alkaline Phosphatase   Date Value Ref Range Status   05/13/2025 62 40 - 150 U/L Final   05/03/2021 54 40 - 150 U/L Final     ALT   Date Value Ref Range Status   05/13/2025 26 0 - 70 U/L Final   05/03/2021 38 0 - 70 U/L Final     AST   Date Value Ref Range Status   05/13/2025 28 0 - 45 U/L Final   05/03/2021 22 0 - 45 U/L Final     Recent Labs   Lab Test 05/13/25  0828 08/09/24  0743   CHOL 229* 214*   HDL 76 72   * 133*   TRIG 46 45      Lab Results   Component Value Date    A1C 5.7 05/13/2025    A1C 6.0 02/15/2024        Thank you for allowing me to participate in the care of your patient.      Sincerely,     Vince Leon MD     [facility]  cc:   No referring provider defined for this encounter.

## 2025-06-09 NOTE — PROGRESS NOTES
Cardiology Clinic Progress Note:    June 9, 2025   Patient Name: John Nicole  Patient MRN: [id number]     Consult indication: HL    HPI:    I had the opportunity to see patient John Nicole in cardiology clinic for a follow up visit. Patient is followed by our colleague Arcadio Regan MD with Primary Care.     Patient is a pleasant 40-year-old male with a past medical history significant for dyslipidemia, family history of familial hypercholesterolemia (mother), hypertension, who presents for follow up.      Patient states that as a teenager he had fasting lipids done for screening prior to starting acne medication and his total cholesterol at that time was approximately 300.  Last cholesterol labs 5/23/2023 notable for total cholesterol 270, HDL 64, , LP(a) 115.  Patient was recently seen by our colleague Dr. Regan with Primary Care and was started on evolocumab, unfortunately this was denied by insurance.  Patient notes that his mother was recently diagnosed with familial hypercholesterolemia, and after having failed multiple statin medications, she is now on evolocumab with excellent results.     Previously started rosuvastatin 40 mg nightly, however this was not well-tolerated and so this was discontinued.  He has since been on ezetimibe 10 mg daily only.  Patient continues to exercise regularly, engages in aerobic exercise, resistance training, has put on muscle mass.  Overall feels great.  Specifically no chest pain, chest pressure, abnormal shortness of breath.    Reviewed lipids from 5/2025, total cholesterol 229, HDL 76, , triglycerides 46.    Patient does not smoke cigarettes, works a stressful job as a regional , with odd hours throughout the week.    BP today in clinic 135/86 mmHg, however at home 120/70 mmHg range.    Assessment and Plan/Recommendations:    In summary, patient is a pleasant 39-year-old male with a family history of his mother being diagnosed  with familial hypercholesterolemia, patient has had severely elevated cholesterol levels since his teens, overall clinical presentation is consistent with familial hypercholesterolemia.  He denies symptoms concerning for angina or decompensated heart failure.     Patient did not tolerate rosuvastatin 40 mg nightly, has been on ezetimibe 10 mg daily.  In addition, patient has made significant lifestyle changes, including increased regular exercise, LDL profile has improved significantly.  However given his strong family history of dyslipidemia, and elevated LP(a) of 115, would favor being more aggressive with lipid management.  Will trial low-dose pravastatin, if not well-tolerated, then will attempt to start evolocumab again for dyslipidemia complicated by statin intolerance.  Follow-up in 1 year, or sooner as needed.      Thank you for allowing our team to participate in the care of John Nicole.  Please do not hesitate to call or page me with any questions or concerns.    Sincerely,     Vince Leon MD, [facility]  Cardiology  Text Page   June 9, 2025    Voice recognition software utilized.       Past Medical History:     Past Medical History:   Diagnosis Date    Familial hypercholesterolemia     Hypertension     See previous records        Past Surgical History:   Past Surgical History:   Procedure Laterality Date    NO HISTORY OF SURGERY         Medications (outpatient):  Current Outpatient Medications   Medication Sig Dispense Refill    ezetimibe (ZETIA) 10 MG tablet Take 1 tablet (10 mg) by mouth daily. 90 tablet 4    lisinopril (ZESTRIL) 20 MG tablet Take 1 tablet (20 mg) by mouth daily. 90 tablet 4    multivitamin, therapeutic with minerals (MULTI-VITAMIN) TABS tablet Take 1 tablet by mouth daily      pravastatin (PRAVACHOL) 20 MG tablet Take 1 tablet (20 mg) by mouth at bedtime. 90 tablet 3       Allergies:  Allergies   Allergen Reactions    Crestor [Rosuvastatin] Muscle Pain (Myalgia)     "Seasonal Allergies        Social History:   History   Drug Use No      History   Smoking Status    Never   Smokeless Tobacco    Never     Social History    Substance and Sexual Activity      Alcohol use: Yes        Comment: One beer every few months       Family History:  Family History   Problem Relation Age of Onset    Diabetes Mother         Pre-diabetic. Has been on metformin for a long time and it is controlled.    Breast Cancer Mother 48    Hypertension Father 35    Sarcoidosis Father     Diabetes Father     Other - See Comments Sister         BRCA2 mutation    Coronary Artery Disease Maternal Grandmother     Coronary Artery Disease Maternal Grandfather     Diabetes Paternal Grandmother     Coronary Artery Disease Paternal Grandmother     No Known Problems Daughter     Cerebrovascular Disease No family hx of     Colon Cancer No family hx of     Prostate Cancer No family hx of        Review of Systems:   A complete review of systems was negative except as mentioned in the History of Present Illness.     Objective & Physical Exam:  /86   Pulse 85   Ht 1.778 m (5' 10\")   Wt 89.6 kg (197 lb 9.6 oz)   SpO2 98%   BMI 28.35 kg/m    Wt Readings from Last 2 Encounters:   06/09/25 89.6 kg (197 lb 9.6 oz)   05/13/25 87.1 kg (192 lb)     Body mass index is 28.35 kg/m .   Body surface area is 2.1 meters squared.    Constitutional: appears stated age, in no apparent distress, appears to be well nourished  Pulmonary: clear to auscultation bilaterally, no wheezes, no rales, no increased work of breathing  Cardiovascular: JVP normal, regular rate, regular rhythm, no murmur appreciated, no lower extremity edema  Neurologic: awake, alert, moves all extremities  Skin: no jaundice, warm on limited exam    Data reviewed:  Lab Results   Component Value Date    WBC 6.2 02/15/2024    WBC 6.2 05/03/2021    RBC 5.11 02/15/2024    RBC 5.03 05/03/2021    HGB 15.5 02/15/2024    HGB 15.5 05/03/2021    HCT 46.6 02/15/2024    HCT " 45.0 05/03/2021    MCV 91 02/15/2024    MCV 90 05/03/2021    MCH 30.3 02/15/2024    MCH 30.8 05/03/2021    MCHC 33.3 02/15/2024    MCHC 34.4 05/03/2021    RDW 11.9 02/15/2024    RDW 11.7 05/03/2021     02/15/2024     05/03/2021     Sodium   Date Value Ref Range Status   05/13/2025 136 135 - 145 mmol/L Final   05/03/2021 137 133 - 144 mmol/L Final     Potassium   Date Value Ref Range Status   05/13/2025 4.1 3.4 - 5.3 mmol/L Final   05/03/2021 4.4 3.4 - 5.3 mmol/L Final     Chloride   Date Value Ref Range Status   05/13/2025 97 (L) 98 - 107 mmol/L Final   05/03/2021 104 94 - 109 mmol/L Final     Carbon Dioxide   Date Value Ref Range Status   05/03/2021 30 20 - 32 mmol/L Final     Carbon Dioxide (CO2)   Date Value Ref Range Status   05/13/2025 25 22 - 29 mmol/L Final     Anion Gap   Date Value Ref Range Status   05/13/2025 14 7 - 15 mmol/L Final   05/03/2021 3 3 - 14 mmol/L Final     Glucose   Date Value Ref Range Status   05/13/2025 110 (H) 70 - 99 mg/dL Final   05/03/2021 102 (H) 70 - 99 mg/dL Final     Urea Nitrogen   Date Value Ref Range Status   05/13/2025 11.6 6.0 - 20.0 mg/dL Final   05/03/2021 13 7 - 30 mg/dL Final     Creatinine   Date Value Ref Range Status   05/13/2025 1.14 0.67 - 1.17 mg/dL Final   05/03/2021 0.98 0.66 - 1.25 mg/dL Final     GFR Estimate   Date Value Ref Range Status   05/13/2025 83 >60 mL/min/1.73m2 Final     Comment:     eGFR calculated using 2021 CKD-EPI equation.   05/03/2021 >90 >60 mL/min/[1.73_m2] Final     Comment:     Non  GFR Calc  Starting 12/18/2018, serum creatinine based estimated GFR (eGFR) will be   calculated using the Chronic Kidney Disease Epidemiology Collaboration   (CKD-EPI) equation.       Calcium   Date Value Ref Range Status   05/13/2025 10.2 8.8 - 10.4 mg/dL Final   05/03/2021 9.4 8.5 - 10.1 mg/dL Final     Bilirubin Total   Date Value Ref Range Status   05/13/2025 0.7 <=1.2 mg/dL Final   05/03/2021 0.7 0.2 - 1.3 mg/dL Final      Alkaline Phosphatase   Date Value Ref Range Status   05/13/2025 62 40 - 150 U/L Final   05/03/2021 54 40 - 150 U/L Final     ALT   Date Value Ref Range Status   05/13/2025 26 0 - 70 U/L Final   05/03/2021 38 0 - 70 U/L Final     AST   Date Value Ref Range Status   05/13/2025 28 0 - 45 U/L Final   05/03/2021 22 0 - 45 U/L Final     Recent Labs   Lab Test 05/13/25  0828 08/09/24  0743   CHOL 229* 214*   HDL 76 72   * 133*   TRIG 46 45      Lab Results   Component Value Date    A1C 5.7 05/13/2025    A1C 6.0 02/15/2024

## 2025-06-09 NOTE — PROGRESS NOTES
Cardiology Clinic Progress Note:    June 9, 2025   Patient Name: John Nicole  Patient MRN: [id number]     Consult indication: ***    HPI:    I had the opportunity to see patient John Nicole in cardiology clinic for a follow up visit. Patient is followed by our colleague Arcadio Regan MD with Primary Care.     ***    Assessment and Plan/Recommendations:    # ***  # ***  # ***  # ***  # ***  # ***  # ***  # ***    Thank you for allowing our team to participate in the care of John Nicole.  Please do not hesitate to call or page me with any questions or concerns.    Sincerely,     Vnice Leon MD, [facility]  Cardiology  Text Page   June 9, 2025    cc  No referring provider defined for this encounter.    Voice recognition software utilized.     Total time spent on this encounter today: Greater than *** minutes, providing care in this encounter including, but not limited to, reviewing prior medical records, laboratory data, imaging studies, diagnostic studies, procedure notes, formulating an assessment and plan, recommendations, discussion and counseling with patient face to face, dictation.    Past Medical History:   The 10-year ASCVD risk score (Lien DK, et al., 2019) is: 1.3%    Values used to calculate the score:      Age: 40 years      Sex: Male      Is Non- : No      Diabetic: No      Tobacco smoker: No      Systolic Blood Pressure: 135 mmHg      Is BP treated: Yes      HDL Cholesterol: 76 mg/dL      Total Cholesterol: 229 mg/dL  Past Medical History:   Diagnosis Date    Familial hypercholesterolemia     Hypertension     See previous records        Past Surgical History:   Past Surgical History:   Procedure Laterality Date    NO HISTORY OF SURGERY         Medications (outpatient):  Current Outpatient Medications   Medication Sig Dispense Refill    ezetimibe (ZETIA) 10 MG tablet Take 1 tablet (10 mg) by mouth daily. 90 tablet 4    lisinopril (ZESTRIL) 20 MG tablet Take  "1 tablet (20 mg) by mouth daily. 90 tablet 4    multivitamin, therapeutic with minerals (MULTI-VITAMIN) TABS tablet Take 1 tablet by mouth daily         Allergies:  Allergies   Allergen Reactions    Crestor [Rosuvastatin] Muscle Pain (Myalgia)    Seasonal Allergies        Social History:   History   Drug Use No      History   Smoking Status    Never   Smokeless Tobacco    Never     Social History    Substance and Sexual Activity      Alcohol use: Yes        Comment: One beer every few months       Family History:  Family History   Problem Relation Age of Onset    Diabetes Mother         Pre-diabetic. Has been on metformin for a long time and it is controlled.    Breast Cancer Mother 48    Hypertension Father 35    Sarcoidosis Father     Diabetes Father     Other - See Comments Sister         BRCA2 mutation    Coronary Artery Disease Maternal Grandmother     Coronary Artery Disease Maternal Grandfather     Diabetes Paternal Grandmother     Coronary Artery Disease Paternal Grandmother     No Known Problems Daughter     Cerebrovascular Disease No family hx of     Colon Cancer No family hx of     Prostate Cancer No family hx of        Review of Systems:   A complete review of systems was negative except as mentioned in the History of Present Illness.     Objective & Physical Exam:  /86   Pulse 85   Ht 1.778 m (5' 10\")   Wt 89.6 kg (197 lb 9.6 oz)   SpO2 98%   BMI 28.35 kg/m    Wt Readings from Last 2 Encounters:   06/09/25 89.6 kg (197 lb 9.6 oz)   05/13/25 87.1 kg (192 lb)     Body mass index is 28.35 kg/m .   Body surface area is 2.1 meters squared.  ***  Constitutional: appears stated age, in no apparent distress, appears to be well nourished  Pulmonary: clear to auscultation bilaterally, no wheezes, no rales, no increased work of breathing  Cardiovascular: JVP normal, regular rate, regular rhythm, normal S1 and S2, no S3, S4, no murmur appreciated, no lower extremity edema***  Gastrointestinal: no " guarding, non-rigid   Neurologic: awake, alert, moves all extremities  Skin: no jaundice, warm on limited exam    Data reviewed:  Lab Results   Component Value Date    WBC 6.2 02/15/2024    WBC 6.2 05/03/2021    RBC 5.11 02/15/2024    RBC 5.03 05/03/2021    HGB 15.5 02/15/2024    HGB 15.5 05/03/2021    HCT 46.6 02/15/2024    HCT 45.0 05/03/2021    MCV 91 02/15/2024    MCV 90 05/03/2021    MCH 30.3 02/15/2024    MCH 30.8 05/03/2021    MCHC 33.3 02/15/2024    MCHC 34.4 05/03/2021    RDW 11.9 02/15/2024    RDW 11.7 05/03/2021     02/15/2024     05/03/2021     Sodium   Date Value Ref Range Status   05/13/2025 136 135 - 145 mmol/L Final   05/03/2021 137 133 - 144 mmol/L Final     Potassium   Date Value Ref Range Status   05/13/2025 4.1 3.4 - 5.3 mmol/L Final   05/03/2021 4.4 3.4 - 5.3 mmol/L Final     Chloride   Date Value Ref Range Status   05/13/2025 97 (L) 98 - 107 mmol/L Final   05/03/2021 104 94 - 109 mmol/L Final     Carbon Dioxide   Date Value Ref Range Status   05/03/2021 30 20 - 32 mmol/L Final     Carbon Dioxide (CO2)   Date Value Ref Range Status   05/13/2025 25 22 - 29 mmol/L Final     Anion Gap   Date Value Ref Range Status   05/13/2025 14 7 - 15 mmol/L Final   05/03/2021 3 3 - 14 mmol/L Final     Glucose   Date Value Ref Range Status   05/13/2025 110 (H) 70 - 99 mg/dL Final   05/03/2021 102 (H) 70 - 99 mg/dL Final     Urea Nitrogen   Date Value Ref Range Status   05/13/2025 11.6 6.0 - 20.0 mg/dL Final   05/03/2021 13 7 - 30 mg/dL Final     Creatinine   Date Value Ref Range Status   05/13/2025 1.14 0.67 - 1.17 mg/dL Final   05/03/2021 0.98 0.66 - 1.25 mg/dL Final     GFR Estimate   Date Value Ref Range Status   05/13/2025 83 >60 mL/min/1.73m2 Final     Comment:     eGFR calculated using 2021 CKD-EPI equation.   05/03/2021 >90 >60 mL/min/[1.73_m2] Final     Comment:     Non  GFR Calc  Starting 12/18/2018, serum creatinine based estimated GFR (eGFR) will be   calculated using the  Chronic Kidney Disease Epidemiology Collaboration   (CKD-EPI) equation.       Calcium   Date Value Ref Range Status   05/13/2025 10.2 8.8 - 10.4 mg/dL Final   05/03/2021 9.4 8.5 - 10.1 mg/dL Final     Bilirubin Total   Date Value Ref Range Status   05/13/2025 0.7 <=1.2 mg/dL Final   05/03/2021 0.7 0.2 - 1.3 mg/dL Final     Alkaline Phosphatase   Date Value Ref Range Status   05/13/2025 62 40 - 150 U/L Final   05/03/2021 54 40 - 150 U/L Final     ALT   Date Value Ref Range Status   05/13/2025 26 0 - 70 U/L Final   05/03/2021 38 0 - 70 U/L Final     AST   Date Value Ref Range Status   05/13/2025 28 0 - 45 U/L Final   05/03/2021 22 0 - 45 U/L Final     Recent Labs   Lab Test 05/13/25  0828 08/09/24  0743   CHOL 229* 214*   HDL 76 72   * 133*   TRIG 46 45      Lab Results   Component Value Date    A1C 5.7 05/13/2025    A1C 6.0 02/15/2024        No results found for this or any previous visit (from the past 4320 hours).

## 2025-06-09 NOTE — PATIENT INSTRUCTIONS
June 9, 2025    Thank you for allowing our Cardiology team to participate in your care.     Please note the following changes to your heart treatment plan:     Medication changes:   - start pravastatin 20 mg at bedtime     Tests to be done:  - labs in 6 months    Follow up:  - Follow up in 1 year, or sooner as needed      For scheduling, please call [phone]      Please contact our team through [employer] or our Nurse Team Voicemail service [phone] for questions or concerns.     General Clinic [phone]     If you are having a medical emergency, please call 911.     Sincerely,    Vince Leon MD, FACC  Cardiology    [facility] and [facility] - [facility] - [facility] - [place]